# Patient Record
Sex: FEMALE | Race: WHITE | Employment: FULL TIME | URBAN - METROPOLITAN AREA
[De-identification: names, ages, dates, MRNs, and addresses within clinical notes are randomized per-mention and may not be internally consistent; named-entity substitution may affect disease eponyms.]

---

## 2017-01-07 ENCOUNTER — OFFICE VISIT (OUTPATIENT)
Dept: FAMILY MEDICINE CLINIC | Age: 37
End: 2017-01-07

## 2017-01-07 VITALS
DIASTOLIC BLOOD PRESSURE: 71 MMHG | SYSTOLIC BLOOD PRESSURE: 116 MMHG | OXYGEN SATURATION: 99 % | HEART RATE: 70 BPM | WEIGHT: 164.38 LBS | BODY MASS INDEX: 28 KG/M2

## 2017-01-07 DIAGNOSIS — F51.01 PRIMARY INSOMNIA: ICD-10-CM

## 2017-01-07 DIAGNOSIS — F41.1 GENERALIZED ANXIETY DISORDER: ICD-10-CM

## 2017-01-07 PROCEDURE — 99213 OFFICE O/P EST LOW 20 MIN: CPT | Performed by: FAMILY MEDICINE

## 2017-01-07 RX ORDER — FLUOXETINE 20 MG/1
20 TABLET, FILM COATED ORAL DAILY
Qty: 30 TABLET | Refills: 0 | Status: SHIPPED | OUTPATIENT
Start: 2017-01-07 | End: 2017-06-28 | Stop reason: ALTCHOICE

## 2017-01-07 RX ORDER — TRAZODONE HYDROCHLORIDE 100 MG/1
100 TABLET ORAL NIGHTLY
Qty: 90 TABLET | Refills: 3 | Status: SHIPPED | OUTPATIENT
Start: 2017-01-07 | End: 2017-01-07 | Stop reason: SDUPTHER

## 2017-01-07 RX ORDER — TRAZODONE HYDROCHLORIDE 100 MG/1
100 TABLET ORAL NIGHTLY
Qty: 30 TABLET | Refills: 0 | Status: SHIPPED | OUTPATIENT
Start: 2017-01-07 | End: 2020-11-19

## 2017-01-07 RX ORDER — FLUOXETINE HYDROCHLORIDE 40 MG/1
40 CAPSULE ORAL DAILY
Qty: 90 CAPSULE | Refills: 3 | Status: SHIPPED | OUTPATIENT
Start: 2017-01-07 | End: 2017-01-07 | Stop reason: SDUPTHER

## 2017-01-07 RX ORDER — FLUOXETINE HYDROCHLORIDE 40 MG/1
40 CAPSULE ORAL DAILY
Qty: 30 CAPSULE | Refills: 0 | Status: SHIPPED | OUTPATIENT
Start: 2017-01-07 | End: 2017-06-28 | Stop reason: ALTCHOICE

## 2017-01-07 RX ORDER — FLUOXETINE 20 MG/1
20 TABLET, FILM COATED ORAL DAILY
Qty: 90 TABLET | Refills: 3 | Status: SHIPPED | OUTPATIENT
Start: 2017-01-07 | End: 2017-01-07 | Stop reason: SDUPTHER

## 2017-02-21 ENCOUNTER — TELEPHONE (OUTPATIENT)
Dept: FAMILY MEDICINE CLINIC | Age: 37
End: 2017-02-21

## 2017-02-24 RX ORDER — LORAZEPAM 0.5 MG/1
0.5 TABLET ORAL EVERY 8 HOURS PRN
Qty: 30 TABLET | Refills: 0 | Status: SHIPPED | OUTPATIENT
Start: 2017-02-24 | End: 2020-11-19

## 2017-06-28 ENCOUNTER — OFFICE VISIT (OUTPATIENT)
Dept: DERMATOLOGY | Age: 37
End: 2017-06-28

## 2017-06-28 DIAGNOSIS — Z12.83 SCREENING EXAM FOR SKIN CANCER: ICD-10-CM

## 2017-06-28 DIAGNOSIS — D48.5 NEOPLASM OF UNCERTAIN BEHAVIOR OF SKIN: ICD-10-CM

## 2017-06-28 DIAGNOSIS — L57.0 ACTINIC KERATOSIS: ICD-10-CM

## 2017-06-28 DIAGNOSIS — D22.9 MULTIPLE BENIGN NEVI: Primary | ICD-10-CM

## 2017-06-28 PROCEDURE — 17000 DESTRUCT PREMALG LESION: CPT | Performed by: DERMATOLOGY

## 2017-06-28 PROCEDURE — 99213 OFFICE O/P EST LOW 20 MIN: CPT | Performed by: DERMATOLOGY

## 2017-06-28 PROCEDURE — 11100 PR BIOPSY OF SKIN LESION: CPT | Performed by: DERMATOLOGY

## 2017-06-28 RX ORDER — SERTRALINE HYDROCHLORIDE 100 MG/1
100 TABLET, FILM COATED ORAL DAILY
COMMUNITY
End: 2017-10-13 | Stop reason: SDUPTHER

## 2017-07-06 ENCOUNTER — TELEPHONE (OUTPATIENT)
Dept: DERMATOLOGY | Age: 37
End: 2017-07-06

## 2017-07-15 ENCOUNTER — OFFICE VISIT (OUTPATIENT)
Dept: FAMILY MEDICINE CLINIC | Age: 37
End: 2017-07-15

## 2017-07-15 VITALS
DIASTOLIC BLOOD PRESSURE: 74 MMHG | BODY MASS INDEX: 28.42 KG/M2 | HEIGHT: 64 IN | WEIGHT: 166.5 LBS | HEART RATE: 79 BPM | SYSTOLIC BLOOD PRESSURE: 114 MMHG

## 2017-07-15 DIAGNOSIS — F32.1 MODERATE SINGLE CURRENT EPISODE OF MAJOR DEPRESSIVE DISORDER (HCC): ICD-10-CM

## 2017-07-15 DIAGNOSIS — R53.82 CHRONIC FATIGUE: Primary | ICD-10-CM

## 2017-07-15 DIAGNOSIS — K58.1 IRRITABLE BOWEL SYNDROME WITH CONSTIPATION: ICD-10-CM

## 2017-07-15 LAB
A/G RATIO: 1.2 (ref 1.1–2.2)
ALBUMIN SERPL-MCNC: 4.2 G/DL (ref 3.4–5)
ALP BLD-CCNC: 64 U/L (ref 40–129)
ALT SERPL-CCNC: 11 U/L (ref 10–40)
ANION GAP SERPL CALCULATED.3IONS-SCNC: 14 MMOL/L (ref 3–16)
AST SERPL-CCNC: 18 U/L (ref 15–37)
BASOPHILS ABSOLUTE: 0 K/UL (ref 0–0.2)
BASOPHILS RELATIVE PERCENT: 0.4 %
BILIRUB SERPL-MCNC: 0.4 MG/DL (ref 0–1)
BUN BLDV-MCNC: 10 MG/DL (ref 7–20)
CALCIUM SERPL-MCNC: 9.9 MG/DL (ref 8.3–10.6)
CHLORIDE BLD-SCNC: 104 MMOL/L (ref 99–110)
CO2: 23 MMOL/L (ref 21–32)
CORTISOL - AM: 4.1 UG/DL (ref 4.3–22.4)
CREAT SERPL-MCNC: 0.6 MG/DL (ref 0.6–1.1)
EOSINOPHILS ABSOLUTE: 0.1 K/UL (ref 0–0.6)
EOSINOPHILS RELATIVE PERCENT: 1.7 %
GFR AFRICAN AMERICAN: >60
GFR NON-AFRICAN AMERICAN: >60
GLOBULIN: 3.4 G/DL
GLUCOSE BLD-MCNC: 87 MG/DL (ref 70–99)
HCT VFR BLD CALC: 37.4 % (ref 36–48)
HEMOGLOBIN: 12.5 G/DL (ref 12–16)
LYMPHOCYTES ABSOLUTE: 2.2 K/UL (ref 1–5.1)
LYMPHOCYTES RELATIVE PERCENT: 30.9 %
MCH RBC QN AUTO: 30.5 PG (ref 26–34)
MCHC RBC AUTO-ENTMCNC: 33.4 G/DL (ref 31–36)
MCV RBC AUTO: 91.3 FL (ref 80–100)
MONOCYTES ABSOLUTE: 0.5 K/UL (ref 0–1.3)
MONOCYTES RELATIVE PERCENT: 6.9 %
NEUTROPHILS ABSOLUTE: 4.2 K/UL (ref 1.7–7.7)
NEUTROPHILS RELATIVE PERCENT: 60.1 %
PDW BLD-RTO: 13.4 % (ref 12.4–15.4)
PLATELET # BLD: 262 K/UL (ref 135–450)
PMV BLD AUTO: 9.4 FL (ref 5–10.5)
POTASSIUM SERPL-SCNC: 4.8 MMOL/L (ref 3.5–5.1)
RBC # BLD: 4.1 M/UL (ref 4–5.2)
SODIUM BLD-SCNC: 141 MMOL/L (ref 136–145)
TOTAL PROTEIN: 7.6 G/DL (ref 6.4–8.2)
TSH REFLEX: 2.31 UIU/ML (ref 0.27–4.2)
VITAMIN B-12: 332 PG/ML (ref 211–911)
VITAMIN D 25-HYDROXY: 26.4 NG/ML
WBC # BLD: 7 K/UL (ref 4–11)

## 2017-07-15 PROCEDURE — 36415 COLL VENOUS BLD VENIPUNCTURE: CPT | Performed by: FAMILY MEDICINE

## 2017-07-15 PROCEDURE — 99214 OFFICE O/P EST MOD 30 MIN: CPT | Performed by: FAMILY MEDICINE

## 2017-08-08 ENCOUNTER — TELEPHONE (OUTPATIENT)
Dept: FAMILY MEDICINE CLINIC | Age: 37
End: 2017-08-08

## 2017-08-30 ENCOUNTER — NURSE ONLY (OUTPATIENT)
Dept: FAMILY MEDICINE CLINIC | Age: 37
End: 2017-08-30

## 2017-08-30 DIAGNOSIS — R79.89 LOW VITAMIN D LEVEL: ICD-10-CM

## 2017-08-30 DIAGNOSIS — R79.89 LOW SERUM CORTISOL LEVEL: Primary | ICD-10-CM

## 2017-08-30 LAB
CORTISOL - AM: 14.2 UG/DL (ref 4.3–22.4)
VITAMIN D 25-HYDROXY: 36.7 NG/ML

## 2017-08-30 PROCEDURE — 36415 COLL VENOUS BLD VENIPUNCTURE: CPT | Performed by: FAMILY MEDICINE

## 2017-10-13 ENCOUNTER — TELEPHONE (OUTPATIENT)
Dept: FAMILY MEDICINE CLINIC | Age: 37
End: 2017-10-13

## 2017-10-16 RX ORDER — SERTRALINE HYDROCHLORIDE 100 MG/1
150 TABLET, FILM COATED ORAL DAILY
COMMUNITY
Start: 2017-10-16 | End: 2020-11-19 | Stop reason: ALTCHOICE

## 2017-12-14 ENCOUNTER — OFFICE VISIT (OUTPATIENT)
Dept: FAMILY MEDICINE CLINIC | Age: 37
End: 2017-12-14

## 2017-12-14 VITALS
BODY MASS INDEX: 29.44 KG/M2 | SYSTOLIC BLOOD PRESSURE: 116 MMHG | WEIGHT: 171.5 LBS | HEART RATE: 83 BPM | DIASTOLIC BLOOD PRESSURE: 81 MMHG

## 2017-12-14 DIAGNOSIS — J02.9 ACUTE PHARYNGITIS, UNSPECIFIED ETIOLOGY: ICD-10-CM

## 2017-12-14 DIAGNOSIS — H66.004 RECURRENT ACUTE SUPPURATIVE OTITIS MEDIA OF RIGHT EAR WITHOUT SPONTANEOUS RUPTURE OF TYMPANIC MEMBRANE: Primary | ICD-10-CM

## 2017-12-14 PROCEDURE — 99213 OFFICE O/P EST LOW 20 MIN: CPT | Performed by: FAMILY MEDICINE

## 2017-12-14 RX ORDER — AZITHROMYCIN 250 MG/1
TABLET, FILM COATED ORAL
Qty: 6 TABLET | Refills: 0 | Status: SHIPPED | OUTPATIENT
Start: 2017-12-14 | End: 2019-08-28

## 2017-12-14 NOTE — PROGRESS NOTES
Subjective:   She presents for right ear pain sore throat blisters in her throat some congestion but not a lot no coughing or fevers        She is allergic to diphenhydramine; other; and penicillins. Objective:   /81 (Site: Left Arm, Position: Sitting, Cuff Size: Large Adult)   Pulse 83   Wt 171 lb 8 oz (77.8 kg)   BMI 29.44 kg/m²   No results found for this visit on 12/14/17. Exam:  Gen. Appearance: Ill appearing but nontoxic, Atraumatic HEENT:  External ears normal, tympanic membranes effusions noted right TM red and canals clear. Nose congested. Throat red. Neck: no rigidity, no tenderness, supple. Lungs: Clear to auscultation bilaterally. Assessment and Plan:  1. Recurrent acute suppurative otitis media of right ear without spontaneous rupture of tympanic membrane  azithromycin (ZITHROMAX) 250 MG tablet   2. Acute pharyngitis, unspecified etiology       Call or return to office prn if these symptoms worsen or fail to improve as anticipated. Avoid tobacco products exposure. The Healthy Family Handout was given to the patient today.   Miriam Mcarthur M.D.

## 2017-12-14 NOTE — PATIENT INSTRUCTIONS
Please read the healthy family handout that you were given and share it with your family. Please compare this printed medication list with your medications at home to be sure they are the same. If you have any medications that are different please contact us immediately at [phone]. Also review your allergies that we have listed, these may also include medications that you have not been able to tolerate, make sure everything listed is correct. If you have any allergies that are different please contact us immediately at [phone].

## 2018-01-03 DIAGNOSIS — F51.01 PRIMARY INSOMNIA: ICD-10-CM

## 2018-01-06 RX ORDER — TRAZODONE HYDROCHLORIDE 100 MG/1
TABLET ORAL
Qty: 90 TABLET | Refills: 3 | OUTPATIENT
Start: 2018-01-06

## 2018-04-05 ENCOUNTER — HOSPITAL ENCOUNTER (OUTPATIENT)
Dept: OTHER | Age: 38
Discharge: HOME | End: 2018-04-30
Attending: PODIATRIST | Admitting: PODIATRIST

## 2018-04-06 LAB
ALBUMIN SERPL-MCNC: 3.6 G/DL (ref 3.4–5)
ALP BLD-CCNC: 37 U/L (ref 40–129)
ALT SERPL-CCNC: 12 U/L (ref 10–40)
AST SERPL-CCNC: 18 U/L (ref 15–37)
BILIRUB SERPL-MCNC: <0.2 MG/DL (ref 0–1)
BILIRUBIN DIRECT: <0.2 MG/DL (ref 0–0.3)
BILIRUBIN, INDIRECT: ABNORMAL MG/DL (ref 0–1)
TOTAL PROTEIN: 5.9 G/DL (ref 6.4–8.2)

## 2018-05-01 ENCOUNTER — HOSPITAL ENCOUNTER (OUTPATIENT)
Dept: OTHER | Age: 38
Discharge: HOME | End: 2018-05-31
Attending: PODIATRIST | Admitting: PODIATRIST

## 2018-08-21 ENCOUNTER — OFFICE VISIT (OUTPATIENT)
Dept: DERMATOLOGY | Age: 38
End: 2018-08-21

## 2018-08-21 DIAGNOSIS — D22.9 MULTIPLE BENIGN NEVI: Primary | ICD-10-CM

## 2018-08-21 DIAGNOSIS — Z12.83 SCREENING EXAM FOR SKIN CANCER: ICD-10-CM

## 2018-08-21 DIAGNOSIS — L57.0 ACTINIC KERATOSES: ICD-10-CM

## 2018-08-21 PROCEDURE — 99213 OFFICE O/P EST LOW 20 MIN: CPT | Performed by: DERMATOLOGY

## 2018-08-21 PROCEDURE — 17000 DESTRUCT PREMALG LESION: CPT | Performed by: DERMATOLOGY

## 2018-08-21 NOTE — PROGRESS NOTES
[facility]) Dermatology  Jeovanny Beltran, [address]      [facility]  1980    42 y.o. female     Date of Visit: 8/21/2018    Last Visit: 1yr    Chief Complaint: Skin check    History of Present Illness:  1. Here for skin/mole check. No new moles. No moles changing in size, shape, color. No moles associated w/ pain, bleeding, pruritus.   -Has not gone to tanning bed in over 1 year   -Plays softball and likes to spend time in sun. Has started to use SPF 50+ sunscreen regularly     2. History of actinic keratoses s/p cryotherapy. Unsure of new lesions     Review of Systems:  Constitutional: Reports general sense of well-being. Skin: No interval severe sunburns. Allergies: Reviewed and updated. Past Medical History, Surgical History, Medications and Allergies reviewed. Past Medical History:   Diagnosis Date    Allergic rhinitis     Asthma     Congenital anomaly, optic nerve, right (HCC)     Constipation     Family history of colon cancer     Gets colonoscopy    Herpes simplex 2013    type2 on culture done by GYN    Irritable bowel syndrome      Past Surgical History:   Procedure Laterality Date    COLONOSCOPY  2010    ELBOW FRACTURE SURGERY Left 10/28/2016    KNEE SURGERY      lef t    TONSILLECTOMY         Allergies   Allergen Reactions    Diphenhydramine      hyper; Benadryl    Other      Environmental allergies - Right eye twitches, SOB, Nausea    Penicillins      Outpatient Prescriptions Marked as Taking for the 8/21/18 encounter (Office Visit) with Jeovanny Beltran MD   Medication Sig Dispense Refill    Multiple Vitamins-Minerals (MULTIVITAMIN ADULT PO) Take by mouth      Omega-3 Fatty Acids (FISH OIL PO) Take by mouth      traZODone (DESYREL) 100 MG tablet Take 1 tablet by mouth nightly 30 tablet 0    Cetirizine HCl (ZYRTEC ALLERGY) 10 MG CAPS Take  by mouth.  acyclovir (ZOVIRAX) 400 MG tablet Take 400 mg by mouth 2 times daily.       Linaclotide (LINZESS) 290 MCG CAPS Take by mouth. 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 The following were examined and determined to be abnormal: Head/face, Breast/axilla/chest, Abdomen, Back, RUE, LUE, RLE and LLE. -General: Well-appearing, NAD  1. Scattered on the trunk and extremities are multiple well-defined round and oval symmetric smoothly-bordered uniformly brown macules and papules. 2. R nasal bridge 1 - ill-defined irregularly-shaped roughly-scaling thin pink macule(s)/papule(s)     Assessment and Plan     1. Benign acquired melanocytic nevi  -Recommend monthly self skin exams   -Educated regarding the ABCDEs of melanoma detection   -Call for any new/changing moles or concerning lesions  -Reviewed sun protective behavior -- sun avoidance during the peak hours of the day, sun-protective clothing (including hat and sunglasses), sunscreen use (water resistant, broad spectrum, SPF at least 30, need for reapplication every 2 to 3 hours), avoidance of tanning beds   -Return for full skin exam in 1 year (sooner if indicated)     2. Actinic keratosis(es)  -Edu re: relationship with chronic cumulative sun exposure, low premalignant potential.   -1 lesion(s) treated w/ liquid nitrogen x 2 cycles - nose. Edu re: risk of blister formation, discomfort, scar, hypopigmentation. Discussed wound care.

## 2018-12-15 ENCOUNTER — HOSPITAL ENCOUNTER (OUTPATIENT)
Dept: MAMMOGRAPHY | Age: 38
Discharge: HOME | End: 2018-12-15
Payer: COMMERCIAL

## 2018-12-15 DIAGNOSIS — Z12.31 ENCOUNTER FOR SCREENING MAMMOGRAM FOR BREAST CANCER: ICD-10-CM

## 2018-12-15 PROCEDURE — 77063 BREAST TOMOSYNTHESIS BI: CPT

## 2018-12-18 DIAGNOSIS — R92.8 ABNORMAL MAMMOGRAM: Primary | ICD-10-CM

## 2019-01-04 ENCOUNTER — HOSPITAL ENCOUNTER (OUTPATIENT)
Dept: ULTRASOUND IMAGING | Age: 39
Discharge: HOME | End: 2019-01-04
Payer: COMMERCIAL

## 2019-01-04 DIAGNOSIS — R92.8 ABNORMAL MAMMOGRAM: ICD-10-CM

## 2019-01-04 PROCEDURE — 76642 ULTRASOUND BREAST LIMITED: CPT

## 2019-01-08 DIAGNOSIS — R92.8 ABNORMAL MAMMOGRAM: Primary | ICD-10-CM

## 2019-06-24 ENCOUNTER — HOSPITAL ENCOUNTER (OUTPATIENT)
Dept: ULTRASOUND IMAGING | Age: 39
Discharge: HOME | End: 2019-06-24
Payer: COMMERCIAL

## 2019-06-24 DIAGNOSIS — R92.8 ABNORMAL MAMMOGRAM: ICD-10-CM

## 2019-06-24 PROCEDURE — 76642 ULTRASOUND BREAST LIMITED: CPT

## 2019-08-28 ENCOUNTER — OFFICE VISIT (OUTPATIENT)
Dept: ORTHOPEDIC SURGERY | Age: 39
End: 2019-08-28
Payer: COMMERCIAL

## 2019-08-28 VITALS
DIASTOLIC BLOOD PRESSURE: 80 MMHG | WEIGHT: 171.52 LBS | HEIGHT: 64 IN | HEART RATE: 81 BPM | BODY MASS INDEX: 29.28 KG/M2 | SYSTOLIC BLOOD PRESSURE: 118 MMHG

## 2019-08-28 DIAGNOSIS — R52 PAIN: Primary | ICD-10-CM

## 2019-08-28 DIAGNOSIS — S92.301A CLOSED AVULSION FRACTURE OF METATARSAL BONE OF RIGHT FOOT, INITIAL ENCOUNTER: ICD-10-CM

## 2019-08-28 PROCEDURE — L4361 PNEUMA/VAC WALK BOOT PRE OTS: HCPCS | Performed by: PHYSICIAN ASSISTANT

## 2019-08-28 PROCEDURE — 99203 OFFICE O/P NEW LOW 30 MIN: CPT | Performed by: PHYSICIAN ASSISTANT

## 2019-08-29 ENCOUNTER — TELEPHONE (OUTPATIENT)
Dept: ORTHOPEDIC SURGERY | Age: 39
End: 2019-08-29

## 2019-08-29 NOTE — TELEPHONE ENCOUNTER
8/29/19 [facility]    -  NO PRECERT REQUIRED - AUTH IF $1500 AND GREATER - PER SANJANA -  REF #[id number] -  NDS

## 2019-09-11 ENCOUNTER — OFFICE VISIT (OUTPATIENT)
Dept: ORTHOPEDIC SURGERY | Age: 39
End: 2019-09-11
Payer: COMMERCIAL

## 2019-09-11 VITALS
DIASTOLIC BLOOD PRESSURE: 88 MMHG | HEART RATE: 88 BPM | WEIGHT: 171.52 LBS | SYSTOLIC BLOOD PRESSURE: 137 MMHG | HEIGHT: 64 IN | BODY MASS INDEX: 29.28 KG/M2

## 2019-09-11 DIAGNOSIS — S92.301A CLOSED AVULSION FRACTURE OF METATARSAL BONE OF RIGHT FOOT, INITIAL ENCOUNTER: Primary | ICD-10-CM

## 2019-09-11 PROCEDURE — 99203 OFFICE O/P NEW LOW 30 MIN: CPT | Performed by: PODIATRIST

## 2019-09-11 RX ORDER — ETONOGESTREL/ETHINYL ESTRADIOL .12-.015MG
RING, VAGINAL VAGINAL
COMMUNITY
Start: 2019-09-08

## 2019-09-11 NOTE — PROGRESS NOTES
HISTORY OF PRESENT ILLNESS: This is an initial visit for a 38-year-old female with a chief complaint of pain to the side of the right foot. She also complains of right plantar heel pain. Approximately 3 months ago she states that her right foot. She self treated the injury this entire time. Eventually the pain was tolerable so she actually trained and completed a 5K race. There is pain with pressure to the side of the foot and with weightbearing. This is relieved mainly with getting off of the foot. FAMILY HISTORY:  Documented in chart. SOCIAL HISTORY: Documented in chart. REVIEW OF SYSTEMS: The patient denies any fever, chills, or night sweats. The patient also denies developing any type of rash. The patient denies any problems with cardiovascular, pulmonary, gastrointestinal, neurologic, urologic, genitourinary, psychiatric, dermatologic, and HEENT systems. Family History, Social History, and Review of Systems were reviewed from patient history form dated on 9/11/2019 and available in the patient's chart under the MEDIA tab. PHYSICAL EXAM:  There is mild edema to the right foot with no ecchymosis. No open lesions or fracture blisters are noted. The base of the fifth metatarsal is painful to palpation mainly on the dorsal aspect but mildly on the lateral aspect. She has palpable pedal pulses bilateral.  Her sensation is grossly intact bilateral..  The remainder of the exam is unremarkable. X-RAYS:  Three nonweightbearing x-ray views of the right foot were reviewed. These demonstrate fracture of the fifth metatarsal base with bone callus formation present. ASSESSMENT:  Fifth metatarsal fracture, midfoot sprain, right foot    PLAN:  I educated the patient on the pathology and its treatment options. The x-rays were reviewed with the patient. A temporary arch support was applied to her right foot. She will combine this with a high tide walker she has been using.     I

## 2019-10-02 ENCOUNTER — OFFICE VISIT (OUTPATIENT)
Dept: ORTHOPEDIC SURGERY | Age: 39
End: 2019-10-02
Payer: COMMERCIAL

## 2019-10-02 VITALS
HEART RATE: 85 BPM | HEIGHT: 64 IN | BODY MASS INDEX: 29.28 KG/M2 | DIASTOLIC BLOOD PRESSURE: 70 MMHG | WEIGHT: 171.52 LBS | SYSTOLIC BLOOD PRESSURE: 118 MMHG

## 2019-10-02 DIAGNOSIS — S93.621A TARSOMETATARSAL (JOINT) (LIGAMENT) SPRAIN, RIGHT, INITIAL ENCOUNTER: Primary | ICD-10-CM

## 2019-10-02 DIAGNOSIS — M79.671 FOOT PAIN, RIGHT: ICD-10-CM

## 2019-10-02 DIAGNOSIS — S93.401A MILD ANKLE SPRAIN, RIGHT, INITIAL ENCOUNTER: ICD-10-CM

## 2019-10-02 DIAGNOSIS — S92.301A CLOSED AVULSION FRACTURE OF METATARSAL BONE OF RIGHT FOOT, INITIAL ENCOUNTER: ICD-10-CM

## 2019-10-02 PROCEDURE — 99213 OFFICE O/P EST LOW 20 MIN: CPT | Performed by: PODIATRIST

## 2019-10-07 ENCOUNTER — HOSPITAL ENCOUNTER (OUTPATIENT)
Dept: PHYSICAL THERAPY | Age: 39
Setting detail: THERAPIES SERIES
Discharge: HOME | End: 2019-10-07
Payer: COMMERCIAL

## 2019-10-07 PROCEDURE — 97161 PT EVAL LOW COMPLEX 20 MIN: CPT

## 2019-10-07 PROCEDURE — 97110 THERAPEUTIC EXERCISES: CPT

## 2019-10-07 PROCEDURE — 97112 NEUROMUSCULAR REEDUCATION: CPT

## 2019-10-14 ENCOUNTER — HOSPITAL ENCOUNTER (OUTPATIENT)
Dept: PHYSICAL THERAPY | Age: 39
Setting detail: THERAPIES SERIES
Discharge: HOME | End: 2019-10-14
Payer: COMMERCIAL

## 2019-10-14 PROCEDURE — 97140 MANUAL THERAPY 1/> REGIONS: CPT

## 2019-10-14 PROCEDURE — 97110 THERAPEUTIC EXERCISES: CPT

## 2019-10-14 PROCEDURE — 97112 NEUROMUSCULAR REEDUCATION: CPT

## 2019-10-21 ENCOUNTER — HOSPITAL ENCOUNTER (OUTPATIENT)
Dept: PHYSICAL THERAPY | Age: 39
Setting detail: THERAPIES SERIES
Discharge: HOME | End: 2019-10-21
Payer: COMMERCIAL

## 2019-10-21 PROCEDURE — 97112 NEUROMUSCULAR REEDUCATION: CPT

## 2019-10-21 PROCEDURE — 97110 THERAPEUTIC EXERCISES: CPT

## 2019-10-22 ENCOUNTER — OFFICE VISIT (OUTPATIENT)
Dept: ORTHOPEDIC SURGERY | Age: 39
End: 2019-10-22
Payer: COMMERCIAL

## 2019-10-22 VITALS
DIASTOLIC BLOOD PRESSURE: 88 MMHG | SYSTOLIC BLOOD PRESSURE: 129 MMHG | HEIGHT: 64 IN | HEART RATE: 70 BPM | WEIGHT: 171.52 LBS | BODY MASS INDEX: 29.28 KG/M2

## 2019-10-22 DIAGNOSIS — S92.301A CLOSED AVULSION FRACTURE OF METATARSAL BONE OF RIGHT FOOT, INITIAL ENCOUNTER: ICD-10-CM

## 2019-10-22 DIAGNOSIS — M79.671 FOOT PAIN, RIGHT: Primary | ICD-10-CM

## 2019-10-22 DIAGNOSIS — S93.401A MILD ANKLE SPRAIN, RIGHT, INITIAL ENCOUNTER: ICD-10-CM

## 2019-10-22 DIAGNOSIS — S93.621A TARSOMETATARSAL (JOINT) (LIGAMENT) SPRAIN, RIGHT, INITIAL ENCOUNTER: ICD-10-CM

## 2019-10-22 PROCEDURE — L3040 FT ARCH SUPRT PREMOLD LONGIT: HCPCS | Performed by: PODIATRIST

## 2019-10-22 PROCEDURE — 99213 OFFICE O/P EST LOW 20 MIN: CPT | Performed by: PODIATRIST

## 2019-10-28 ENCOUNTER — APPOINTMENT (OUTPATIENT)
Dept: PHYSICAL THERAPY | Age: 39
End: 2019-10-28
Payer: COMMERCIAL

## 2020-07-06 NOTE — PROGRESS NOTES
[facility]) Dermatology  Timo Massey MD  [phone]      Kaycee Filter  1980    37 y.o. female     Date of Visit: 7/7/2020    Last Visit: 1.5yr    Chief Complaint: Skin check    History of Present Illness:  1. Here for skin/mole check. No new moles. No moles changing in size, shape, color. No moles associated w/ pain, bleeding, pruritus.   -Prior tanning bed use   -Plays softball and likes to spend time in sun. Uses SPF 50+ sunscreen regularly     2. History of actinic keratoses s/p cryotherapy. Unsure of new lesions     3. Concerned about a new asymptomatic lesion under R breast     4. Concerned about a new raised lesion on R side of chest     Review of Systems:  Constitutional: Reports general sense of well-being. Skin: No interval severe sunburns. Allergies: Reviewed and updated. Past Medical History, Surgical History, Medications and Allergies reviewed. Past Medical History:   Diagnosis Date    Allergic rhinitis     Asthma     Congenital anomaly, optic nerve, right (HCC)     Constipation     Family history of colon cancer     Gets colonoscopy    Herpes simplex 2013    type2 on culture done by GYN    Irritable bowel syndrome      Past Surgical History:   Procedure Laterality Date    COLONOSCOPY  2010    ELBOW FRACTURE SURGERY Left 10/28/2016    KNEE SURGERY      lef t    TONSILLECTOMY         Allergies   Allergen Reactions    Diphenhydramine      hyper;  Benadryl    Other      Environmental allergies - Right eye twitches, SOB, Nausea    Penicillins      Outpatient Medications Marked as Taking for the 7/7/20 encounter (Office Visit) with Timo Massey MD   Medication Sig Dispense Refill    hydrOXYzine (VISTARIL) 50 MG capsule 50 mg daily       sodium chloride nebulizer 0.9 % NEBU 30 mL with albuterol (5 MG/ML) 0.5% NEBU Inhale into the lungs as needed      NUVARING 0.12-0.015 MG/24HR vaginal ring       Multiple Vitamins-Minerals (MULTIVITAMIN ADULT PO) Take by mouth      Omega-3 Fatty Acids (FISH OIL PO) Take by mouth      sertraline (ZOLOFT) 100 MG tablet Take 1.5 tablets by mouth daily      LORazepam (ATIVAN) 0.5 MG tablet Take 1 tablet by mouth every 8 hours as needed for Anxiety 30 tablet 0    traZODone (DESYREL) 100 MG tablet Take 1 tablet by mouth nightly 30 tablet 0    Cetirizine HCl (ZYRTEC ALLERGY) 10 MG CAPS Take  by mouth.  acyclovir (ZOVIRAX) 400 MG tablet Take 400 mg by mouth 2 times daily.  Linaclotide (LINZESS) 290 MCG CAPS Take  by mouth. 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 The following were examined and determined to be abnormal: Head/face, Breast/axilla/chest, Abdomen, Back, RUE, LUE, RLE and LLE. -General: Well-appearing, NAD  1. Scattered on the trunk and extremities are multiple well-defined round and oval symmetric smoothly-bordered uniformly brown macules and papules. 2. R upper cheek 1, R nasal bridge 1 - ill-defined irregularly-shaped roughly-scaling thin pink macule(s)/papule(s)   3. Inferior aspect of R breast - well-defined \"stuck-on\" verrucous tan-brown papule(s)   4. R lateral chest - pedunculated skin-colored soft papule     Assessment and Plan     1. Benign acquired melanocytic nevi  -Recommend monthly self skin exams   -Educated regarding the ABCDEs of melanoma detection   -Call for any new/changing moles or concerning lesions  -Reviewed sun protective behavior -- sun avoidance during the peak hours of the day, sun-protective clothing (including hat and sunglasses), sunscreen use (water resistant, broad spectrum, SPF at least 30, need for reapplication every 2 to 3 hours), avoidance of tanning beds   -Return for full skin exam in 1 year (sooner if indicated)     2.  Actinic keratosis(es)  -Edu re: relationship with chronic cumulative sun exposure, low premalignant potential.   -2 lesion(s) treated w/ liquid nitrogen x 2 cycles - R upper cheek 1, R nasal bridge 1. Edu re: risk of blister formation, discomfort, scar, hypopigmentation. Discussed wound care. 3. Seborrheic keratosis(es)  -Reassurance re: benignity  -No treatment performed.       4. Skin tag  -Reassurance re: benignity

## 2020-07-07 ENCOUNTER — OFFICE VISIT (OUTPATIENT)
Dept: DERMATOLOGY | Age: 40
End: 2020-07-07
Payer: COMMERCIAL

## 2020-07-07 VITALS — TEMPERATURE: 97.5 F

## 2020-07-07 PROCEDURE — 17003 DESTRUCT PREMALG LES 2-14: CPT | Performed by: DERMATOLOGY

## 2020-07-07 PROCEDURE — 17000 DESTRUCT PREMALG LESION: CPT | Performed by: DERMATOLOGY

## 2020-07-07 PROCEDURE — 99213 OFFICE O/P EST LOW 20 MIN: CPT | Performed by: DERMATOLOGY

## 2020-07-07 RX ORDER — HYDROXYZINE PAMOATE 50 MG/1
50 CAPSULE ORAL DAILY
COMMUNITY
Start: 2020-06-29

## 2020-09-01 ENCOUNTER — HOSPITAL ENCOUNTER (OUTPATIENT)
Dept: MAMMOGRAPHY | Age: 40
Discharge: HOME | End: 2020-09-01
Payer: COMMERCIAL

## 2020-09-01 PROCEDURE — 77063 BREAST TOMOSYNTHESIS BI: CPT

## 2020-09-10 ENCOUNTER — HOSPITAL ENCOUNTER (OUTPATIENT)
Dept: ULTRASOUND IMAGING | Age: 40
Discharge: HOME | End: 2020-09-10
Payer: COMMERCIAL

## 2020-09-10 ENCOUNTER — HOSPITAL ENCOUNTER (OUTPATIENT)
Dept: MAMMOGRAPHY | Age: 40
Discharge: HOME | End: 2020-09-10
Payer: COMMERCIAL

## 2020-09-10 PROCEDURE — G0279 TOMOSYNTHESIS, MAMMO: HCPCS

## 2020-09-24 LAB
ALBUMIN SERPL-MCNC: 3.9 G/DL
ALP BLD-CCNC: 63 U/L
ALT SERPL-CCNC: 30 U/L
ANION GAP SERPL CALCULATED.3IONS-SCNC: 1.4 MMOL/L
AST SERPL-CCNC: 40 U/L
BASOPHILS ABSOLUTE: NORMAL
BASOPHILS RELATIVE PERCENT: NORMAL
BILIRUB SERPL-MCNC: 0.3 MG/DL (ref 0.1–1.4)
BUN BLDV-MCNC: 0.3 MG/DL
CALCIUM SERPL-MCNC: 9.1 MG/DL
CHLORIDE BLD-SCNC: 107 MMOL/L
CHOLESTEROL, TOTAL: 204 MG/DL
CHOLESTEROL/HDL RATIO: NORMAL
CO2: 19 MMOL/L
CREAT SERPL-MCNC: 0.72 MG/DL
EOSINOPHILS ABSOLUTE: NORMAL
EOSINOPHILS RELATIVE PERCENT: NORMAL
GFR CALCULATED: 104
GLUCOSE BLD-MCNC: 102 MG/DL
HCT VFR BLD CALC: 38.9 % (ref 36–46)
HDLC SERPL-MCNC: 63 MG/DL (ref 35–70)
HEMOGLOBIN: 13.1 G/DL (ref 12–16)
LDL CHOLESTEROL CALCULATED: 118 MG/DL (ref 0–160)
LYMPHOCYTES ABSOLUTE: NORMAL
LYMPHOCYTES RELATIVE PERCENT: NORMAL
MCH RBC QN AUTO: 30.7 PG
MCHC RBC AUTO-ENTMCNC: 33.7 G/DL
MCV RBC AUTO: 91 FL
MONOCYTES ABSOLUTE: NORMAL
MONOCYTES RELATIVE PERCENT: NORMAL
NEUTROPHILS ABSOLUTE: NORMAL
NEUTROPHILS RELATIVE PERCENT: NORMAL
NONHDLC SERPL-MCNC: NORMAL MG/DL
PDW BLD-RTO: 12.2 %
PLATELET # BLD: 257 K/ΜL
PMV BLD AUTO: NORMAL FL
POTASSIUM SERPL-SCNC: 4.3 MMOL/L
RBC # BLD: 12.2 10^6/ΜL
SODIUM BLD-SCNC: 139 MMOL/L
TOTAL PROTEIN: 6.6
TRIGL SERPL-MCNC: 128 MG/DL
TSH SERPL DL<=0.05 MIU/L-ACNC: 1.5 UIU/ML
VITAMIN B-12: 388
VITAMIN D 25-HYDROXY: 35
VITAMIN D2, 25 HYDROXY: NORMAL
VITAMIN D3,25 HYDROXY: NORMAL
VLDLC SERPL CALC-MCNC: 23 MG/DL
WBC # BLD: 5.9 10^3/ML

## 2020-11-19 ENCOUNTER — VIRTUAL VISIT (OUTPATIENT)
Dept: FAMILY MEDICINE CLINIC | Age: 40
End: 2020-11-19
Payer: COMMERCIAL

## 2020-11-19 ENCOUNTER — TELEPHONE (OUTPATIENT)
Dept: FAMILY MEDICINE CLINIC | Age: 40
End: 2020-11-19

## 2020-11-19 PROCEDURE — 99203 OFFICE O/P NEW LOW 30 MIN: CPT | Performed by: NURSE PRACTITIONER

## 2020-11-19 RX ORDER — ALBUTEROL SULFATE 90 UG/1
2 AEROSOL, METERED RESPIRATORY (INHALATION) EVERY 6 HOURS PRN
COMMUNITY
Start: 2020-11-19

## 2020-11-19 ASSESSMENT — ENCOUNTER SYMPTOMS
ALLERGIC/IMMUNOLOGIC NEGATIVE: 1
ABDOMINAL PAIN: 0
GASTROINTESTINAL NEGATIVE: 1
NAUSEA: 0
ANAL BLEEDING: 0
EYES NEGATIVE: 1
SHORTNESS OF BREATH: 1
BLOOD IN STOOL: 0

## 2020-11-19 ASSESSMENT — PATIENT HEALTH QUESTIONNAIRE - PHQ9
SUM OF ALL RESPONSES TO PHQ QUESTIONS 1-9: 0
2. FEELING DOWN, DEPRESSED OR HOPELESS: 0
SUM OF ALL RESPONSES TO PHQ QUESTIONS 1-9: 0
1. LITTLE INTEREST OR PLEASURE IN DOING THINGS: 0
SUM OF ALL RESPONSES TO PHQ QUESTIONS 1-9: 0
SUM OF ALL RESPONSES TO PHQ9 QUESTIONS 1 & 2: 0

## 2020-11-19 NOTE — TELEPHONE ENCOUNTER
Please call patient and inform her that I found her HSV (herpes) typing in her old records.   On 4/16/2013 it was confirmed by lab she has HSV-2/ type 2 genital herpes  She can continue using preventative acyclovir daily or may change to episodic/prn therapy for outbreaks only if she desires

## 2020-11-19 NOTE — PROGRESS NOTES
Galilea Madden is a 41 y.o. female evaluated via telephone on 11/19/2020.       Consent:  She and/or health care decision maker is aware that that she may receive a bill for this telephone service, depending on her insurance coverage, and has provided verbal consent to proceed: Yes      Gerri Sidhu

## 2021-03-31 ENCOUNTER — TELEPHONE (OUTPATIENT)
Dept: FAMILY MEDICINE CLINIC | Age: 41
End: 2021-03-31

## 2021-03-31 NOTE — TELEPHONE ENCOUNTER
----- Message from [facility] sent at 3/31/2021  9:14 AM EDT -----  Subject: Message to Provider    QUESTIONS  Information for Provider? Patient wants to know if the office received her   records from [address] She also would like to know if she can get her   bloodwork done. Doctor Mele Ruiz wanted to look at her records first. Call   patient back. ---------------------------------------------------------------------------  --------------  Antonia LEAVITT  What is the best way for the office to contact you? OK to leave message on   voicemail  Preferred Call Back Phone Number? [phone]  ---------------------------------------------------------------------------  --------------  SCRIPT ANSWERS  Relationship to Patient?  Self
Pt informed that we have not received any records
We have not received any records.   Patient most likely needs to come in to sign a records release before they will send them (no records release has been scanned into media)
[phone]

## 2021-04-29 ENCOUNTER — TELEPHONE (OUTPATIENT)
Dept: FAMILY MEDICINE CLINIC | Age: 41
End: 2021-04-29

## 2021-04-29 NOTE — TELEPHONE ENCOUNTER
Pt was wanting to see if she could get some orders for blood work since we got her records from [employer].

## 2021-05-05 ENCOUNTER — OFFICE VISIT (OUTPATIENT)
Dept: FAMILY MEDICINE CLINIC | Age: 41
End: 2021-05-05
Payer: COMMERCIAL

## 2021-05-05 VITALS
WEIGHT: 209 LBS | DIASTOLIC BLOOD PRESSURE: 70 MMHG | HEART RATE: 90 BPM | SYSTOLIC BLOOD PRESSURE: 112 MMHG | HEIGHT: 64 IN | BODY MASS INDEX: 35.68 KG/M2 | OXYGEN SATURATION: 97 %

## 2021-05-05 DIAGNOSIS — E55.9 VITAMIN D DEFICIENCY: ICD-10-CM

## 2021-05-05 DIAGNOSIS — Z11.4 SCREENING FOR HIV WITHOUT PRESENCE OF RISK FACTORS: ICD-10-CM

## 2021-05-05 DIAGNOSIS — R06.83 SNORING: ICD-10-CM

## 2021-05-05 DIAGNOSIS — R53.83 FATIGUE, UNSPECIFIED TYPE: ICD-10-CM

## 2021-05-05 DIAGNOSIS — R40.0 DAYTIME SOMNOLENCE: ICD-10-CM

## 2021-05-05 DIAGNOSIS — R63.5 ABNORMAL WEIGHT GAIN: ICD-10-CM

## 2021-05-05 DIAGNOSIS — Z72.89 OTHER PROBLEMS RELATED TO LIFESTYLE: ICD-10-CM

## 2021-05-05 DIAGNOSIS — E66.9 CLASS 2 OBESITY WITHOUT SERIOUS COMORBIDITY WITH BODY MASS INDEX (BMI) OF 35.0 TO 35.9 IN ADULT, UNSPECIFIED OBESITY TYPE: Primary | ICD-10-CM

## 2021-05-05 DIAGNOSIS — Z13.1 ENCOUNTER FOR SCREENING FOR DIABETES MELLITUS: ICD-10-CM

## 2021-05-05 PROCEDURE — 99214 OFFICE O/P EST MOD 30 MIN: CPT | Performed by: NURSE PRACTITIONER

## 2021-05-06 ENCOUNTER — VIRTUAL VISIT (OUTPATIENT)
Dept: PULMONOLOGY | Age: 41
End: 2021-05-06
Payer: COMMERCIAL

## 2021-05-06 ENCOUNTER — TELEPHONE (OUTPATIENT)
Dept: PULMONOLOGY | Age: 41
End: 2021-05-06

## 2021-05-06 DIAGNOSIS — G47.10 HYPERSOMNIA: ICD-10-CM

## 2021-05-06 DIAGNOSIS — F45.8 BRUXISM: ICD-10-CM

## 2021-05-06 DIAGNOSIS — R06.83 SNORING: Primary | ICD-10-CM

## 2021-05-06 DIAGNOSIS — R53.83 FATIGUE, UNSPECIFIED TYPE: ICD-10-CM

## 2021-05-06 LAB
ESTIMATED AVERAGE GLUCOSE: 102.5 MG/DL
HBA1C MFR BLD: 5.2 %
HEPATITIS C ANTIBODY INTERPRETATION: NORMAL
HIV AG/AB: NORMAL
HIV ANTIGEN: NORMAL
HIV-1 ANTIBODY: NORMAL
HIV-2 AB: NORMAL
T3 TOTAL: 1.84 NG/ML (ref 0.8–2)
T4 FREE: 1.1 NG/DL (ref 0.9–1.8)
THYROID PEROXIDASE (TPO) ABS: 11 IU/ML
TSH SERPL DL<=0.05 MIU/L-ACNC: 1.54 UIU/ML (ref 0.27–4.2)
VITAMIN D 25-HYDROXY: 34 NG/ML

## 2021-05-06 PROCEDURE — 99204 OFFICE O/P NEW MOD 45 MIN: CPT | Performed by: INTERNAL MEDICINE

## 2021-05-06 ASSESSMENT — SLEEP AND FATIGUE QUESTIONNAIRES
HOW LIKELY ARE YOU TO NOD OFF OR FALL ASLEEP WHILE WATCHING TV: 3
ESS TOTAL SCORE: 11
HOW LIKELY ARE YOU TO NOD OFF OR FALL ASLEEP WHILE LYING DOWN TO REST IN THE AFTERNOON WHEN CIRCUMSTANCES PERMIT: 3
HOW LIKELY ARE YOU TO NOD OFF OR FALL ASLEEP IN A CAR, WHILE STOPPED FOR A FEW MINUTES IN TRAFFIC: 0
HOW LIKELY ARE YOU TO NOD OFF OR FALL ASLEEP WHILE SITTING AND READING: 1

## 2021-05-06 NOTE — TELEPHONE ENCOUNTER
.Within this Telehealth Consent, the terms you and yours refer to the person using the Telehealth Service (Service), or in the case of a use of the Service by or on behalf of a minor, you and yours refer to and include (i) the parent or legal guardian who provides consent to the use of the Service by such minor or uses the Service on behalf of such minor, and (ii) the minor for whom consent is being provided or on whose behalf the Service is being utilized. When using Service, you will be consulting with your health care providers via the use of Telehealth.   Telehealth involves the delivery of healthcare services using electronic communications, information technology or other means between a healthcare provider and a patient who are not in the same physical location. Telehealth may be used for diagnosis, treatment, follow-up and/or patient education, and may include, but is not limited to, one or more of the following:    Electronic transmission of medical records, photo images, personal health information or other data between a patient and a healthcare provider    Interactions between a patient and healthcare provider via audio, video and/or data communications    Use of output data from medical devices, sound and video files    Anticipated Benefits   The use of Telehealth by your Provider(s) through the Service may have the following possible benefits:    Making it easier and more efficient for you to access medical care and treatment for the conditions treated by such Provider(s) utilizing the Service    Allowing you to obtain medical care and treatment by Provider(s) at times that are convenient for you    Enabling you to interact with Provider(s) without the necessity of an in-office appointment     Possible Risks   While the use of Telehealth can provide potential benefits for you, there are also potential risks associated with the use of Telehealth.  These risks include, but may not be the provision of medical care and treatment via Telehealth and the Service and you may not be able to receive diagnosis and/or treatment through the Service for every condition for which you seek diagnosis and/or treatment. 11. There are potential risks to the use of Telehealth, including but not limited to the risks described in this Telehealth Consent. 12. Your Provider(s) have discussed the use of Telehealth and the Service with you, including the benefits and risks of such and you have provided oral consent to your Provider(s) for the use of Telehealth and the Service. 15. You understand that it is your duty to provide your Provider(s) truthful, accurate and complete information, including all relevant information regarding care that you may have received or may be receiving from other healthcare providers outside of the Service. 14. You understand that each of your Provider(s) may determine in his or sole discretion that your condition is not suitable for diagnosis and/or treatment using the Service, and that you may need to seek medical care and treatment a specialist or other healthcare provider, outside of the Service. 15. You understand that you are fully responsible for payment for all services provided by Provider(s) or through use of the Service and that you may not be able to use third-party insurance. 16. You represent that (a) you have read this Telehealth Consent carefully, (b) you understand the risks and benefits of the Service and the use of Telehealth in the medical care and treatment provided to you by Provider(s) using the Service, and (c) you have the legal capacity and authority to provide this consent for yourself and/or the minor for which you are consenting under applicable federal and state laws, including laws relating to the age of [de-identified] and/or parental/guardian consent.    17. You give your informed consent to the use of Telehealth by Provider(s) using the Service under the terms described in the Terms of Service and this Telehealth Consent. The patient was read the following statement and has consented to the visit as of 5/6/21. The patient has been scheduled for their first telehealth visit on 5/6/21 with Dr. Oscar Renee.

## 2021-05-06 NOTE — PROGRESS NOTES
[facility]                                                          TELEHEALTH EVALUATION: Service performed was Audio/Visual (During MBXKA-59 public health emergency) and not a face-to-face visit           CHIEF COMPLAINT: Snoring     Consulting provider: Jazmin Kendrick, ALLAN - CNP    HPI:   Snoring at night for the past all her life. The severity of snoring is moderate to severe. Worse in supine position and better on the side. Wakes self snoring. Not sure abut observed sleep apnea. No dry mouth upon awakening. Patient is complaining of daytime sleepiness, fatigue and tiredness during the day. Bedtime 9:30 pm and rise time is 6 am. Sleep onset few seconds. 1 nocturia. Wakes up 3 times at night. It takes him 10 minutes to fall back a sleep. 2 naps/weekedn for 1-2 hrs. Sometimes headache in am. + Bruxism. No car wrecks or near wrecks because of the sleepiness. No nodding off while driving. Old records were reviewed and summarized by me. Past Medical History:   Diagnosis Date    Allergic rhinitis     Anxiety     Asthma     Congenital anomaly, optic nerve, right ([address]     Constipation     Depression     Family history of colon cancer     Gets colonoscopy    Family history of ovarian cancer     Family history of thyroid cancer     Herpes simplex 2013    type2 on culture done by GYN    Hyperlipidemia     Irritable bowel syndrome        Past Surgical History:        Procedure Laterality Date    COLONOSCOPY  2010    ELBOW FRACTURE SURGERY Left 10/28/2016    INCONTINENCE SURGERY  2019    KNEE SURGERY      lef t    TONSILLECTOMY         Allergies:  is allergic to diphenhydramine; other; and penicillins. Social History:    TOBACCO:   reports that she quit smoking about 11 years ago. Her smoking use included cigarettes. She has a 6.00 pack-year smoking history.  She has never used smokeless tobacco.  ETOH:   reports current alcohol use. Family History:       Problem Relation Age of Onset    Cancer Paternal Grandfather         multiple NMSC    Cancer Mother         thyroid, colon,ovarian,    Ulcerative Colitis Mother     Cancer Father         skin       Current Medications:    Current Outpatient Medications:     albuterol sulfate HFA (PROAIR HFA) 108 (90 Base) MCG/ACT inhaler, Inhale 2 puffs into the lungs every 6 hours as needed for Wheezing or Shortness of Breath, Disp: , Rfl:     hydrOXYzine (VISTARIL) 50 MG capsule, 50 mg daily , Disp: , Rfl:     NUVARING 0.12-0.015 MG/24HR vaginal ring, , Disp: , Rfl:     Multiple Vitamins-Minerals (MULTIVITAMIN ADULT PO), Take by mouth, Disp: , Rfl:     Cetirizine HCl (ZYRTEC ALLERGY) 10 MG CAPS, Take  by mouth., Disp: , Rfl:     acyclovir (ZOVIRAX) 400 MG tablet, Take 400 mg by mouth 2 times daily. , Disp: , Rfl:     Linaclotide (LINZESS) 290 MCG CAPS, Take  by mouth., Disp: , Rfl:       REVIEW OF SYSTEMS:  Constitutional: Negative for fever  HENT: Negative for sore throat  Eyes: Negative for redness   Respiratory: Negative for dyspnea, cough  Cardiovascular: Negative for chest pain  Gastrointestinal: Negative for vomiting, diarrhea   Genitourinary: Negative for hematuria   Musculoskeletal: Negative for arthralgias   Skin: Negative for rash  Neurological: Negative for syncope  Hematological: Negative for adenopathy  Psychiatric/Behavorial: Negative for anxiety      Objective:   PHYSICAL EXAM:    Last menstrual period 04/21/2021.' on RA  O2 Sat:  Mallampati class IV. Temperature:  Constitutional:  No acute distress. Appears well developed and nourished. Eyes: No sclera icterus. EOM intact. No visible discharge. HENT: Head is normocephalic and atraumatic. Mucus membranes are moist and the tongue appears normal. Normal appearing nose. External Ears normal.   Neck: No visualized mass. [place] is midline   Resp: No accessory muscle use.  Respiratory effort normal. No visualized signs of difficulty breathing or respiratory distress. Cardiovascular: No LE edema. Musculoskeletal: Normal gait with no signs of ataxia. Normal range of motion of the neck. Skin: No significant exanthematous lesions or discoloration noted on facial skin    Neuro: Awake. Alert. Able to follow commands. No facial asymmetry. No gaze palsy. Psych: No agitation. Normal affect. No hallucinations. Oriented to person/time/place. No anxiety. Normal judgement and insight. DATA reviewed by me:   TSH 1.54     Assessment:       · Snoring  · Hypersomnia and Fatigue   · Bruxism  · Mild intermittent asthma on albuterol as needed      Plan:        HST evaluate for sleep related breathing disorder.  Treatment options were discussed with patient if HST reveals SHANICE, including CPAP therapy, oral appliances, upper airway surgery and hypoglossal nerve stimulation.  APAP min pressure of 8 and max pressure of 16 cmH2O if positive HST    Discussed with patient the pathophysiology of apnea.  Sleep hygiene   Avoid sedatives, alcohol and caffeinated drinks at bed time.  No driving motorized vehicles or operating heavy machinery while fatigue, drowsy or sleepy.  Weight loss is also recommended as a long-term intervention.  Continue with mouthguard.  Complications of SHANICE if not treated were discussed with patient patient to include systemic hypertension, pulmonary hypertension, cardiovascular morbidities, car accidents and all cause mortality. Mariella Dickerson is a 41 y.o. female being evaluated by a Virtual Visit (video visit) encounter to address concerns as mentioned above. A caregiver was present when appropriate. Due to this being a TeleHealth encounter (During [place] [address] public health emergency), evaluation of the following organ systems was limited: Vitals/Constitutional/EENT/Resp/CV/GI//MS/Neuro/Skin/Heme-Lymph-Imm.   Pursuant to the emergency declaration under the [facility], [address] waiver authority and the [employer] and [employer] Act, this Virtual Visit was conducted with patient's (and/or legal guardian's) consent, to reduce the patient's risk of exposure to COVID-19 and provide necessary medical care. The patient (and/or legal guardian) has also been advised to contact this office for worsening conditions or problems, and seek emergency medical treatment and/or call 911 if deemed necessary. Services were provided through a video synchronous discussion virtually to substitute for in-person clinic visit. Patient was located in her home, provider was located in his office. --Ger Dunaway MD on 5/6/2021 at 11:51 AM    An electronic signature was used to authenticate this note.

## 2021-05-11 DIAGNOSIS — E55.9 VITAMIN D DEFICIENCY: Primary | ICD-10-CM

## 2021-05-20 PROBLEM — E55.9 VITAMIN D DEFICIENCY: Status: ACTIVE | Noted: 2021-05-20

## 2021-05-20 PROBLEM — R53.83 FATIGUE: Status: ACTIVE | Noted: 2021-05-20

## 2021-05-20 PROBLEM — E66.812 CLASS 2 OBESITY WITHOUT SERIOUS COMORBIDITY WITH BODY MASS INDEX (BMI) OF 35.0 TO 35.9 IN ADULT: Status: ACTIVE | Noted: 2021-05-20

## 2021-05-20 PROBLEM — R63.5 ABNORMAL WEIGHT GAIN: Status: ACTIVE | Noted: 2021-05-20

## 2021-05-20 PROBLEM — R06.83 SNORING: Status: ACTIVE | Noted: 2021-05-20

## 2021-05-20 PROBLEM — E66.9 CLASS 2 OBESITY WITHOUT SERIOUS COMORBIDITY WITH BODY MASS INDEX (BMI) OF 35.0 TO 35.9 IN ADULT: Status: ACTIVE | Noted: 2021-05-20

## 2021-05-20 PROBLEM — R40.0 DAYTIME SOMNOLENCE: Status: ACTIVE | Noted: 2021-05-20

## 2021-05-20 ASSESSMENT — ENCOUNTER SYMPTOMS
EYES NEGATIVE: 1
SHORTNESS OF BREATH: 0
NAUSEA: 0
ABDOMINAL PAIN: 0
BLOOD IN STOOL: 0
ANAL BLEEDING: 0
GASTROINTESTINAL NEGATIVE: 1
ALLERGIC/IMMUNOLOGIC NEGATIVE: 1
RESPIRATORY NEGATIVE: 1

## 2021-05-27 ENCOUNTER — HOSPITAL ENCOUNTER (OUTPATIENT)
Dept: SLEEP CENTER | Age: 41
Discharge: HOME | End: 2021-05-29
Payer: COMMERCIAL

## 2021-05-27 DIAGNOSIS — G47.10 HYPERSOMNIA: ICD-10-CM

## 2021-05-27 DIAGNOSIS — R53.83 FATIGUE, UNSPECIFIED TYPE: ICD-10-CM

## 2021-05-27 DIAGNOSIS — R06.83 SNORING: ICD-10-CM

## 2021-05-27 PROCEDURE — 95806 SLEEP STUDY UNATT&RESP EFFT: CPT | Performed by: INTERNAL MEDICINE

## 2021-05-27 PROCEDURE — 95806 SLEEP STUDY UNATT&RESP EFFT: CPT

## 2021-06-01 DIAGNOSIS — G47.33 OSA (OBSTRUCTIVE SLEEP APNEA): Primary | ICD-10-CM

## 2021-06-17 ENCOUNTER — TELEPHONE (OUTPATIENT)
Dept: FAMILY MEDICINE CLINIC | Age: 41
End: 2021-06-17

## 2021-06-17 DIAGNOSIS — N95.1 MENOPAUSAL SYMPTOMS: Primary | ICD-10-CM

## 2021-06-17 NOTE — TELEPHONE ENCOUNTER
Ok to order [facility] and   I also need to know the 1st day of her last menstrual cycle

## 2021-06-21 ENCOUNTER — NURSE ONLY (OUTPATIENT)
Dept: FAMILY MEDICINE CLINIC | Age: 41
End: 2021-06-21
Payer: COMMERCIAL

## 2021-06-21 DIAGNOSIS — N95.1 MENOPAUSAL SYMPTOMS: ICD-10-CM

## 2021-06-21 LAB
FOLLICLE STIMULATING HORMONE: 15.6 MIU/ML
LUTEINIZING HORMONE: 11.1 MIU/ML

## 2021-06-21 PROCEDURE — 36415 COLL VENOUS BLD VENIPUNCTURE: CPT | Performed by: NURSE PRACTITIONER

## 2021-07-21 ENCOUNTER — TELEPHONE (OUTPATIENT)
Dept: PULMONOLOGY | Age: 41
End: 2021-07-21

## 2021-07-21 ENCOUNTER — VIRTUAL VISIT (OUTPATIENT)
Dept: PULMONOLOGY | Age: 41
End: 2021-07-21
Payer: COMMERCIAL

## 2021-07-21 DIAGNOSIS — G47.33 MILD OBSTRUCTIVE SLEEP APNEA: Primary | ICD-10-CM

## 2021-07-21 DIAGNOSIS — J45.20 MILD INTERMITTENT ASTHMA, UNSPECIFIED WHETHER COMPLICATED: ICD-10-CM

## 2021-07-21 DIAGNOSIS — G47.10 HYPERSOMNIA: ICD-10-CM

## 2021-07-21 DIAGNOSIS — R53.83 FATIGUE, UNSPECIFIED TYPE: ICD-10-CM

## 2021-07-21 DIAGNOSIS — F45.8 BRUXISM: ICD-10-CM

## 2021-07-21 PROCEDURE — 99214 OFFICE O/P EST MOD 30 MIN: CPT | Performed by: INTERNAL MEDICINE

## 2021-07-21 ASSESSMENT — SLEEP AND FATIGUE QUESTIONNAIRES
HOW LIKELY ARE YOU TO NOD OFF OR FALL ASLEEP WHEN YOU ARE A PASSENGER IN A CAR FOR AN HOUR WITHOUT A BREAK: 0
HOW LIKELY ARE YOU TO NOD OFF OR FALL ASLEEP WHILE LYING DOWN TO REST IN THE AFTERNOON WHEN CIRCUMSTANCES PERMIT: 3
HOW LIKELY ARE YOU TO NOD OFF OR FALL ASLEEP WHILE SITTING INACTIVE IN A PUBLIC PLACE: 0
HOW LIKELY ARE YOU TO NOD OFF OR FALL ASLEEP WHILE SITTING AND READING: 3
HOW LIKELY ARE YOU TO NOD OFF OR FALL ASLEEP IN A CAR, WHILE STOPPED FOR A FEW MINUTES IN TRAFFIC: 0
HOW LIKELY ARE YOU TO NOD OFF OR FALL ASLEEP WHILE SITTING QUIETLY AFTER LUNCH WITHOUT ALCOHOL: 1
HOW LIKELY ARE YOU TO NOD OFF OR FALL ASLEEP WHILE WATCHING TV: 3
ESS TOTAL SCORE: 10
HOW LIKELY ARE YOU TO NOD OFF OR FALL ASLEEP WHILE SITTING AND TALKING TO SOMEONE: 0

## 2021-07-21 NOTE — PROGRESS NOTES
[facility]                                                          TELEHEALTH EVALUATION: Service performed was Audio/Visual (During FBWLY-71 public health emergency) and not a face-to-face visit           CHIEF COMPLAINT: Follow-up SHANICE      HPI:   HST was reviewed by me and noted below. Results were dicussed with patient and multiple good questions were answered. Patient started CPAP. Feels the world difference. Started a new mask. Dreaming a lot and more refreshed in am. Sleeps 10-11 hrs. Feels much better overall. Patient is using CPAP 7-8  hrs/night. Using humidifier. No snoring on CPAP. The pressure is well tolerated. The mask is comfortable. No mask leak. No significant daytime sleepiness. No nodding off when driving. Bedtime is 9:30 pm and rise time is 6 am. Sleep onset is 10 minutes. ESS is 10. Past Medical History:   Diagnosis Date    Allergic rhinitis     Anxiety     Asthma     Class 2 obesity without serious comorbidity with body mass index (BMI) of 35.0 to 35.9 in adult 5/20/2021    Congenital anomaly, optic nerve, right ([facility] [address]     Constipation     Depression     Family history of colon cancer     Gets colonoscopy    Family history of ovarian cancer     Family history of thyroid cancer     Herpes simplex 2013    type2 on culture done by GYN    Hyperlipidemia     Irritable bowel syndrome        Past Surgical History:        Procedure Laterality Date    COLONOSCOPY  2010    ELBOW FRACTURE SURGERY Left 10/28/2016    INCONTINENCE SURGERY  2019    KNEE SURGERY      lef t    TONSILLECTOMY         Allergies:  is allergic to diphenhydramine, other, and penicillins. Social History:    TOBACCO:   reports that she quit smoking about 11 years ago. Her smoking use included cigarettes. She has a 6.00 pack-year smoking history. She has never used smokeless tobacco.  ETOH:   reports current alcohol use.       Family History:       Problem Relation Age of Onset    Cancer Paternal Grandfather         multiple NMSC    Cancer Mother         thyroid, colon,ovarian,    Ulcerative Colitis Mother     Cancer Father         skin       Current Medications:    Current Outpatient Medications:     albuterol sulfate HFA (PROAIR HFA) 108 (90 Base) MCG/ACT inhaler, Inhale 2 puffs into the lungs every 6 hours as needed for Wheezing or Shortness of Breath, Disp: , Rfl:     hydrOXYzine (VISTARIL) 50 MG capsule, 50 mg daily , Disp: , Rfl:     NUVARING 0.12-0.015 MG/24HR vaginal ring, , Disp: , Rfl:     Multiple Vitamins-Minerals (MULTIVITAMIN ADULT PO), Take by mouth, Disp: , Rfl:     Cetirizine HCl (ZYRTEC ALLERGY) 10 MG CAPS, Take  by mouth., Disp: , Rfl:     acyclovir (ZOVIRAX) 400 MG tablet, Take 400 mg by mouth 2 times daily. , Disp: , Rfl:     Linaclotide (LINZESS) 290 MCG CAPS, Take  by mouth., Disp: , Rfl:         Objective:   PHYSICAL EXAM:    There were no vitals taken for this visit.' on RA  Mallampati class IV. Constitutional:  No acute distress. Appears well developed and nourished. Eyes: No sclera icterus. EOM intact. No visible discharge. HENT: Head is normocephalic and atraumatic. Mucus membranes are moist and the tongue appears normal. Normal appearing nose. External Ears normal.   Neck: No visualized mass. Oval Ar is midline   Resp: No accessory muscle use. Respiratory effort normal. No visualized signs of difficulty breathing or respiratory distress. Cardiovascular: No LE edema. Musculoskeletal: Normal gait with no signs of ataxia. Normal range of motion of the neck. Skin: No significant exanthematous lesions or discoloration noted on facial skin    Neuro: Awake. Alert. Able to follow commands. No facial asymmetry. No gaze palsy. Psych: No agitation. Normal affect. No hallucinations. Oriented to person/time/place. No anxiety. Normal judgement and insight.                 DATA reviewed by me:   HST 5/27/2021 AHI 5 and desaturation to 82%      CPAP data 06/21-07/20 2021 reviewed by me. Uses 7-8  hrs/night with 90% compliance and AHI of  1.1. Median pressure of 9.5 cmH2O, 95th pressure of  12.1 cmH2O. CPAP cmH2O      Assessment:       · Mild SHANICE. APAP 8-16 cmH2O. Optimal compliance and efficacy upon review today. · Hypersomnia and Fatigue - improved   · Bruxism  · Mild intermittent asthma on albuterol as needed      Plan:       · Change APAP 10-14 cmH2O   · Advised to use CPAP 6-8 hrs at night and during naps. · Replacement of mask, tubing, head straps every 3-6 months or sooner if damaged. · Follow up CPAP compliance and pressure adjustment if needed  · Sleep hygiene  · Avoid sedatives, alcohol and caffeinated drinks at bed time. · No driving motorized vehicles or operating heavy machinery while fatigue, drowsy or sleepy. · Weight loss is also recommended as a long-term intervention. · Complications of SHANICE if not treated were discussed with patient patient to include systemic hypertension, pulmonary hypertension, cardiovascular morbidities, car accidents and all cause mortality. · Continue albuterol as needed  · Follow up in 1 year or sooner if needed           Daisy Ramos is a 41 y.o. female being evaluated by a Virtual Visit (video visit) encounter to address concerns as mentioned above. A caregiver was present when appropriate. Due to this being a TeleHealth encounter ([facility]), evaluation of the following organ systems was limited: Vitals/Constitutional/EENT/Resp/CV/GI//MS/Neuro/Skin/Heme-Lymph-Imm.   Pursuant to the emergency declaration under the [facility], [address] [place] [address] [facility] authority and the [employer] and [employer] Act, this Virtual Visit was conducted with patient's (and/or legal guardian's) consent, to reduce the patient's risk of exposure to COVID-19 and provide necessary medical care.  The patient (and/or legal guardian) has also been advised to contact this office for worsening conditions or problems, and seek emergency medical treatment and/or call 911 if deemed necessary. Services were provided through a video synchronous discussion virtually to substitute for in-person clinic visit. Patient was located in her home, provider was located in his office. --Joanie Conner MD on 7/21/2021 at 11:35 AM    An electronic signature was used to authenticate this note.

## 2021-07-21 NOTE — TELEPHONE ENCOUNTER
Patient needs scheduled for 1 year fua. Left message asking patient to return call to office. See appt desk    LOV: 7/25/21    Assessment:       · Snoring  · Hypersomnia and Fatigue   · Bruxism  · Mild intermittent asthma on albuterol as needed      Plan:       · HST evaluate for sleep related breathing disorder. · Treatment options were discussed with patient if HST reveals SHANICE, including CPAP therapy, oral appliances, upper airway surgery and hypoglossal nerve stimulation. · APAP min pressure of 8 and max pressure of 16 cmH2O if positive HST   · Discussed with patient the pathophysiology of apnea. · Sleep hygiene  · Avoid sedatives, alcohol and caffeinated drinks at bed time. · No driving motorized vehicles or operating heavy machinery while fatigue, drowsy or sleepy. · Weight loss is also recommended as a long-term intervention. · Continue with mouthguard.     · Complications of SHANICE if not treated were discussed with patient patient to include systemic hypertension, pulmonary hypertension, cardiovascular morbidities, car accidents and all cause mortality.

## 2021-08-31 ENCOUNTER — TELEPHONE (OUTPATIENT)
Dept: FAMILY MEDICINE CLINIC | Age: 41
End: 2021-08-31

## 2021-08-31 NOTE — TELEPHONE ENCOUNTER
Pt called stating that she was at a convention last Tuesday. She ate at a [employer], that night she was vomiting and had diarrhea. States that the vomiting stopped but the diarrhea has continued, then started having severe stomach cramps last night with the diarrhea. 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 Pt states she just feels terrible. Pt has been vaccinated as well.  Call back pt with recommendations [phone]

## 2021-10-18 ENCOUNTER — TELEPHONE (OUTPATIENT)
Dept: FAMILY MEDICINE CLINIC | Age: 41
End: 2021-10-18

## 2021-10-18 DIAGNOSIS — Z76.89 ENCOUNTER TO ESTABLISH CARE WITH NEW DOCTOR: Primary | ICD-10-CM

## 2021-10-18 NOTE — TELEPHONE ENCOUNTER
----- Message from [facility] sent at 10/18/2021  1:03 PM EDT -----  Subject: Referral Request    QUESTIONS   Reason for referral request? pt needs another referral for dermatology for   the same office. Her current dermatologist left and the new doctor needs a   referral. Fax? [phone]   Has the physician seen you for this condition before? No   Preferred Specialist (if applicable)? Do you already have an appointment scheduled? No  Additional Information for Provider? Call pt with referral  ---------------------------------------------------------------------------  --------------  CALL BACK INFO  What is the best way for the office to contact you? OK to leave message on   voicemail  Preferred Call Back Phone Number?  [phone]

## 2021-11-11 ENCOUNTER — TELEPHONE (OUTPATIENT)
Dept: PULMONOLOGY | Age: 41
End: 2021-11-11

## 2021-11-11 DIAGNOSIS — F45.8 BRUXISM: ICD-10-CM

## 2021-11-11 DIAGNOSIS — J45.20 MILD INTERMITTENT ASTHMA, UNSPECIFIED WHETHER COMPLICATED: ICD-10-CM

## 2021-11-11 DIAGNOSIS — R53.83 FATIGUE, UNSPECIFIED TYPE: ICD-10-CM

## 2021-11-11 DIAGNOSIS — G47.33 MILD OBSTRUCTIVE SLEEP APNEA: Primary | ICD-10-CM

## 2021-11-11 DIAGNOSIS — G47.10 HYPERSOMNIA: ICD-10-CM

## 2021-11-11 NOTE — TELEPHONE ENCOUNTER
Patient called stating that her pressures feel too low and would like the pressures increased. Please advise      Compliance scanned into review      Assessment:       · Mild SHANICE. APAP 8-16 cmH2O. Optimal compliance and efficacy upon review today. · Hypersomnia and Fatigue - improved   · Bruxism  · Mild intermittent asthma on albuterol as needed      Plan:       · Change APAP 10-14 cmH2O   · Advised to use CPAP 6-8 hrs at night and during naps. · Replacement of mask, tubing, head straps every 3-6 months or sooner if damaged. · Follow up CPAP compliance and pressure adjustment if needed  · Sleep hygiene  · Avoid sedatives, alcohol and caffeinated drinks at bed time. · No driving motorized vehicles or operating heavy machinery while fatigue, drowsy or sleepy. · Weight loss is also recommended as a long-term intervention. · Complications of SHANICE if not treated were discussed with patient patient to include systemic hypertension, pulmonary hypertension, cardiovascular morbidities, car accidents and all cause mortality. · Continue albuterol as needed  · Follow up in 1 year or sooner if needed               Sahil Downing is a 41 y.o. female being evaluated by a Virtual Visit (video visit) encounter to address concerns as mentioned above.  A caregiver was present when appropriate.  Due to this being a TeleHealth encounter (During [facility] public health emergency), evaluation of the following organ systems was limited: Vitals/Constitutional/EENT/Resp/CV/GI//MS/Neuro/Skin/Heme-Lymph-Imm.  Pursuant to the emergency declaration under the [facility], [address] waiver authority and the [employer] and [employer] Act, this Virtual Visit was conducted with patient's (and/or legal guardian's) consent, to reduce the patient's risk of exposure to COVID-19 and provide necessary medical care.  The patient (and/or legal guardian) has also been advised to contact this office for worsening conditions or problems, and seek emergency medical treatment and/or call 911 if deemed necessary.     Services were provided through a video synchronous discussion virtually to substitute for in-person clinic visit.  Patient was located in her home, provider was located in his office.     --Chris Swenson MD on 7/21/2021 at 11:35 AM     An electronic signature was used to authenticate this note.

## 2021-11-12 NOTE — TELEPHONE ENCOUNTER
Spoke with Codey Qiu at [facility] they did receive order but it has not yet been changed.  Will follow up

## 2022-08-16 ENCOUNTER — OFFICE VISIT (OUTPATIENT)
Dept: PULMONOLOGY | Age: 42
End: 2022-08-16
Payer: COMMERCIAL

## 2022-08-16 VITALS
WEIGHT: 207 LBS | SYSTOLIC BLOOD PRESSURE: 118 MMHG | HEIGHT: 64 IN | BODY MASS INDEX: 35.34 KG/M2 | DIASTOLIC BLOOD PRESSURE: 78 MMHG | OXYGEN SATURATION: 98 % | HEART RATE: 79 BPM

## 2022-08-16 DIAGNOSIS — F32.A UNCONTROLLED DEPRESSION: ICD-10-CM

## 2022-08-16 DIAGNOSIS — G47.10 HYPERSOMNIA: ICD-10-CM

## 2022-08-16 DIAGNOSIS — G47.33 MILD OBSTRUCTIVE SLEEP APNEA: Primary | ICD-10-CM

## 2022-08-16 PROCEDURE — 99214 OFFICE O/P EST MOD 30 MIN: CPT | Performed by: INTERNAL MEDICINE

## 2022-08-16 RX ORDER — LIRAGLUTIDE 6 MG/ML
INJECTION, SOLUTION SUBCUTANEOUS
COMMUNITY
Start: 2022-08-02

## 2022-08-16 ASSESSMENT — SLEEP AND FATIGUE QUESTIONNAIRES
ESS TOTAL SCORE: 14
HOW LIKELY ARE YOU TO NOD OFF OR FALL ASLEEP WHILE SITTING AND TALKING TO SOMEONE: 0
HOW LIKELY ARE YOU TO NOD OFF OR FALL ASLEEP WHEN YOU ARE A PASSENGER IN A CAR FOR AN HOUR WITHOUT A BREAK: 2
HOW LIKELY ARE YOU TO NOD OFF OR FALL ASLEEP WHILE LYING DOWN TO REST IN THE AFTERNOON WHEN CIRCUMSTANCES PERMIT: 3
HOW LIKELY ARE YOU TO NOD OFF OR FALL ASLEEP WHILE SITTING INACTIVE IN A PUBLIC PLACE: 0
HOW LIKELY ARE YOU TO NOD OFF OR FALL ASLEEP WHILE WATCHING TV: 3
HOW LIKELY ARE YOU TO NOD OFF OR FALL ASLEEP IN A CAR, WHILE STOPPED FOR A FEW MINUTES IN TRAFFIC: 0
HOW LIKELY ARE YOU TO NOD OFF OR FALL ASLEEP WHILE SITTING QUIETLY AFTER LUNCH WITHOUT ALCOHOL: 3
HOW LIKELY ARE YOU TO NOD OFF OR FALL ASLEEP WHILE SITTING AND READING: 3

## 2022-08-16 NOTE — PROGRESS NOTES
[facility]          CHIEF COMPLAINT: Follow-up SHANICE      HPI:   Doing well with CPAP. Uses every night. At times pulls mask off. Patient is using CPAP 7-8  hrs/night. Using humidifier. No snoring on CPAP. The pressure is well tolerated. The mask is comfortable. No mask leak. Still sleepy during the day. No nodding off when driving. Bedtime is 9:30 pm and rise time is 6:30-7 am. Sleep onset is 5 seconds. Wakes up 1-2 times/night, 10  min to fall back a sleep. 3 naps/week for 1 hr. ESS is 14. Depression is better, but not well controlled. No restless feelings in legs at night. No loss of muscle strength when angry or laugh. No hallucination when dozing off or waking up from sleep. No paralysis upon awakening from sleep or going to sleep.   + nightmares      Past Medical History:   Diagnosis Date    Allergic rhinitis     Anxiety     Asthma     Class 2 obesity without serious comorbidity with body mass index (BMI) of 35.0 to 35.9 in adult 5/20/2021    Congenital anomaly, optic nerve, right (HCC)     Constipation     Depression     Family history of colon cancer     Gets colonoscopy    Family history of ovarian cancer     Family history of thyroid cancer     Herpes simplex 2013    type2 on culture done by GYN    Hyperlipidemia     Irritable bowel syndrome        Past Surgical History:        Procedure Laterality Date    COLONOSCOPY  2010    ELBOW FRACTURE SURGERY Left 10/28/2016    INCONTINENCE SURGERY  2019    KNEE SURGERY      lef t    TONSILLECTOMY         Allergies:  is allergic to diphenhydramine, other, and penicillins. Social History:    TOBACCO:   reports that she quit smoking about 12 years ago. Her smoking use included cigarettes. She has a 6.00 pack-year smoking history. She has never used smokeless tobacco.  ETOH:   reports current alcohol use.       Family History:       Problem Relation Age of Onset    Cancer Paternal Grandfather         multiple NMSC    Cancer Mother         thyroid, colon,ovarian,    Ulcerative Colitis Mother     Cancer Father         skin       Current Medications:    Current Outpatient Medications:     SAXENDA 18 MG/3ML SOPN, INJECT UNDER SKIN 0.6 MG DAILY FOR 7 DAYS THEN INCREASE BY 0.6 MG EVERY WEEK TIL AT 3MG DAILY DOSE, Disp: , Rfl:     sertraline (ZOLOFT) 50 MG tablet, TAKE 1 TABLET BY MOUTH EVERY DAY, Disp: , Rfl:     albuterol sulfate HFA (PROVENTIL;VENTOLIN;PROAIR) 108 (90 Base) MCG/ACT inhaler, Inhale 2 puffs into the lungs every 6 hours as needed for Wheezing or Shortness of Breath, Disp: , Rfl:     hydrOXYzine (VISTARIL) 50 MG capsule, 50 mg daily , Disp: , Rfl:     NUVARING 0.12-0.015 MG/24HR vaginal ring, , Disp: , Rfl:     Multiple Vitamins-Minerals (MULTIVITAMIN ADULT PO), Take by mouth, Disp: , Rfl:     Cetirizine HCl 10 MG CAPS, Take  by mouth., Disp: , Rfl:     acyclovir (ZOVIRAX) 400 MG tablet, Take 400 mg by mouth 2 times daily. , Disp: , Rfl:     linaclotide (LINZESS) 290 MCG CAPS capsule, Take  by mouth., Disp: , Rfl:         Objective:   PHYSICAL EXAM:    Blood pressure 118/78, pulse 79, height 5' 4\" (1.626 m), weight 207 lb (93.9 kg), SpO2 98 %.' on RA  Gen: No distress. Obese. BMI of 35.53  Eyes: PERRL. No sclera icterus. No conjunctival injection. ENT: No discharge. Pharynx clear. Mallampati class IV. Neck: Trachea midline. No obvious mass. Neck circumference \"  Resp: No accessory muscle use. No crackles. No wheezes. No rhonchi. No dullness on percussion. Good air entry. CV: Regular rate. Regular rhythm. No murmur or rub. No edema. GI: Non-tender. Non-distended. No hernia. Skin: Warm and dry. No nodule on exposed extremities. Lymph: No cervical LAD. No supraclavicular LAD. M/S: No cyanosis. No joint deformity. No clubbing. Neuro: Awake. Alert. Moves all four extremities. Psych: Oriented x 3. No anxiety.                    DATA reviewed by me:   HST 5/27/2021 AHI 5 and desaturation to 82%      CPAP data 06/21-07/20 2021 reviewed by me. Uses 7-8  hrs/night with 90% compliance and AHI of  1.1. Median pressure of 9.5 cmH2O, 95th pressure of  12.1 cmH2O. CPAP cmH2O    CPAP data 07/11-08/09 2022 reviewed by me. Uses 7-8  hrs/night with 87% compliance and AHI of  0.2. Median pressure of 11.3 cmH2O, 95th pressure of  12.7 cmH2O. CPAP cmH2O      Assessment:       Mild SHANICE. APAP 11-15 cmH2O. Optimal compliance and efficacy upon review today. Hypersomnia and Fatigue - improving with residual daytime sleepiness. Probably uncontrolled depression is contributing. Bruxism  Mild intermittent asthma on albuterol as needed      Plan:       Order for CPAP supplies  Continue CPAP 6-8 hrs at night and during naps. Replacement of mask, tubing, head straps every 3-6 months or sooner if damaged. Follow up CPAP compliance and pressure adjustment if needed  Sleep hygiene  Patient will discuss uncontrolled depression with PCP for better control. Will consider CPAP followed MSLT if fail to improve. Avoid sedatives, alcohol and caffeinated drinks at bed time. No driving motorized vehicles or operating heavy machinery while fatigue, drowsy or sleepy. Weight loss is also recommended as a long-term intervention. Discussed with patient educational, supportive and behavioral interventions to improve CPAP compliance.   Follow up in 1 year or sooner if needed

## 2023-08-16 ENCOUNTER — OFFICE VISIT (OUTPATIENT)
Dept: PULMONOLOGY | Age: 43
End: 2023-08-16
Payer: COMMERCIAL

## 2023-08-16 VITALS
SYSTOLIC BLOOD PRESSURE: 122 MMHG | DIASTOLIC BLOOD PRESSURE: 74 MMHG | OXYGEN SATURATION: 98 % | HEART RATE: 75 BPM | HEIGHT: 64 IN | BODY MASS INDEX: 31.41 KG/M2 | RESPIRATION RATE: 16 BRPM | WEIGHT: 184 LBS

## 2023-08-16 DIAGNOSIS — J45.20 MILD INTERMITTENT ASTHMA, UNSPECIFIED WHETHER COMPLICATED: ICD-10-CM

## 2023-08-16 DIAGNOSIS — G47.33 MILD OBSTRUCTIVE SLEEP APNEA: Primary | ICD-10-CM

## 2023-08-16 DIAGNOSIS — G47.10 HYPERSOMNIA: ICD-10-CM

## 2023-08-16 PROCEDURE — 99214 OFFICE O/P EST MOD 30 MIN: CPT | Performed by: INTERNAL MEDICINE

## 2023-08-16 ASSESSMENT — SLEEP AND FATIGUE QUESTIONNAIRES
HOW LIKELY ARE YOU TO NOD OFF OR FALL ASLEEP WHILE SITTING AND READING: 0
ESS TOTAL SCORE: 10
HOW LIKELY ARE YOU TO NOD OFF OR FALL ASLEEP WHEN YOU ARE A PASSENGER IN A CAR FOR AN HOUR WITHOUT A BREAK: 3
NECK CIRCUMFERENCE (INCHES): 13
HOW LIKELY ARE YOU TO NOD OFF OR FALL ASLEEP WHILE SITTING AND TALKING TO SOMEONE: 0
HOW LIKELY ARE YOU TO NOD OFF OR FALL ASLEEP WHILE LYING DOWN TO REST IN THE AFTERNOON WHEN CIRCUMSTANCES PERMIT: 3
HOW LIKELY ARE YOU TO NOD OFF OR FALL ASLEEP IN A CAR, WHILE STOPPED FOR A FEW MINUTES IN TRAFFIC: 0
HOW LIKELY ARE YOU TO NOD OFF OR FALL ASLEEP WHILE SITTING INACTIVE IN A PUBLIC PLACE: 0
HOW LIKELY ARE YOU TO NOD OFF OR FALL ASLEEP WHILE SITTING QUIETLY AFTER LUNCH WITHOUT ALCOHOL: 1
HOW LIKELY ARE YOU TO NOD OFF OR FALL ASLEEP WHILE WATCHING TV: 3

## 2023-08-16 NOTE — PROGRESS NOTES
[facility]          CHIEF COMPLAINT: Follow-up SHANICE      HPI:   Doing well with her CPAP. Feels better. Patient is using CPAP 8-9  hrs/night. Using humidifier. No snoring on CPAP. The pressure is well tolerated. The mask is comfortable. No mask leak. No daytime sleepiness. No nodding off when driving. Bedtime is 9:30 pm and rise time is 7 am. Sleep onset is 2 seconds. Takes naps on weekend. Overall doing better and her depression feels better controlled. ESS 10. Past Medical History:   Diagnosis Date    Allergic rhinitis     Anxiety     Asthma     Class 2 obesity without serious comorbidity with body mass index (BMI) of 35.0 to 35.9 in adult 5/20/2021    Congenital anomaly, optic nerve, right (HCC)     Constipation     Depression     Family history of colon cancer     Gets colonoscopy    Family history of ovarian cancer     Family history of thyroid cancer     Herpes simplex 2013    type2 on culture done by GYN    Hyperlipidemia     Irritable bowel syndrome        Past Surgical History:        Procedure Laterality Date    COLONOSCOPY  2010    ELBOW FRACTURE SURGERY Left 10/28/2016    INCONTINENCE SURGERY  2019    KNEE SURGERY      lef t    TONSILLECTOMY         Allergies:  is allergic to diphenhydramine, other, and penicillins. Social History:    TOBACCO:   reports that she quit smoking about 13 years ago. Her smoking use included cigarettes. She has a 6.00 pack-year smoking history. She has been exposed to tobacco smoke. She has never used smokeless tobacco.  ETOH:   reports current alcohol use.       Family History:       Problem Relation Age of Onset    Cancer Paternal Grandfather         multiple NMSC    Cancer Mother         thyroid, colon,ovarian,    Ulcerative Colitis Mother     Cancer Father         skin       Current Medications:    Current Outpatient Medications:     SAXENDA 18 MG/3ML SOPN, INJECT UNDER SKIN 0.6 MG DAILY FOR

## 2023-11-13 NOTE — PROGRESS NOTES
"Subjective:     Star Centeno is a 82 y.o. male who presents for Hospital Follow-up.    Transitional Care Management  TCM Outreach Date and Time: Filed (11/7/2023 10:12 AM)    Discharge Questions  Actual Discharge Date: 11/06/23  Now that you are home, how are you feeling?: Good  Did you receive any new prescriptions?: Yes  Were you able to get them filled?: Yes  Meds to Bed or Pharmacy filled?: Pharmacy  Do you have any questions about your current medications or new medications (Review Med Rec)?: No  Do you have a follow up appointment scheduled with your PCP?: Yes  Appointment Date: 11/13/23  Appointment Time: 1120  Any issues or paperwork you wish to discuss with your PCP?: No  Does this patient qualify for the CCM program?: Yes (routed to Radha Dos Santos RN. Unable to enroll, patient on service with )    Transitional Care  Number of attempts made to contact patient: 1  Current or previous attempts competed within two business days of discharge? : Yes  Provided education regarding treatment plan, medications, self-management, ADLs?: Yes ( has started service. Advised patient to contact  on call for any catheter related issues or questions. Has f/u scheduled w/ Urology.)  Has patient completed an Advanced Directive?: No  Has the Care Manager's phone number provided?: No  Is there anything else I can help you with?: No    Discharge Summary  Chief Complaint: Urinary Catheter Problem  Admitting Diagnosis: Gross hematuria (599.71 (ICD-9-CM))  Discharge Diagnosis: Gross Hematuria        HPI:   Recently hospitalized for going symptoms of hematuria.  Hospital discharge summary as below:    \"This is a 82 y.o. male with a past medical history of prostate cancer s/p radiation, hyperlipidemia, aortic aneurysm, history of pulmonary embolism was on anticoagulation with Xarelto, but had a recent biopsy on 10/25 presenting with hematuria, urinary retention and severe pain.  Anticoagulation was stopped.  Hedrick " 2020    TELEHEALTH EVALUATION -- Audio/Visual (During KNSIA-06 public health emergency)    Chief Complaint   Patient presents with   [address]     pt was a pt of tobi        HPI:  Russel Kim (:  1980) has requested an audio/video evaluation for the following concern(s): This is a 41year old female being seen to establish care. Her last PCP was at [facility]  She was last seen 2.5 months ago for weight gain. 4 pounds every 3-4 days  Jittery, tired, shaky. Just didn't feel right. glucose 102  T3 187  (???) unsure what TSH was and unsure what other labs done. But was told her thyroid was \"abnormal\"  Patient states she feels much better now. She started going to figure weight loss. She was prescribed benzophetamine and has lost 13 pounds and states her labs at weight loss clinic were \"normal\"  Going to gym 5 days a week now    Mom had thyroid cancer, ovarian  and colon cancer  Patient states she had genetic testing with ob/gyn at [address] [place] [address] and was \"negative\"    Has history of following diagnosis:    IBS with constipation and history of colon polyps and + [facility] colon cancer in mother  Patient had colonoscopy  with Dr Junior Schuster? GI and needs one every 5 years now. Patient states she had polyps on colonoscopy in 2017  Has chronic constipation and on linzess  X 4 years and no side effects. She states the medication works well and wants to continue. On acyclovir about once a week for fever blisters. 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 The patient also has had a previous episode of genital herpes in . She is Unsure if this was type 1 transplanted or type 2 genital herpes. She has never had another genital out break since.   She states she uses the acyclovir mostly for cold sores     Uses hydroxyzine bid for anxiety and feels that she does "catheter was placed and started on bladder irrigation.  Symptoms gradually improved.  The patient had another bleed, obstructing clots after restarting Xarelto.  Hematuria stopped, Hedrcik catheter was removed. The patient and daughter want to stop Xarelto.  They understand and accept the risk of their decision.  Venous ultrasound did not show evidence for clot.  Bleeding stopped after Xarelto was discontinued however the patient also had another urinary outflow obstruction and required Hedrick catheter placement again.  Patient was started on tamsulosin that did not help with pain and urine flow.  His metoprolol was discontinued due to hypotension. \"    -Patient states that it is doing well.  Has noticed a significant improvement in the hematuria; however, continues to struggle some with the Hedrick catheter.  He does occasionally have some urinary incontinence around the catheter.  He does continue to follow-up closely with urology.  Plans for stent removal later today.  -Patient denies any fevers, chills, abdominal pain, hematuria, dysuria at this time.    -Patient does state that he has been having some difficulty with increased fatigue, lightheadedness.  During hospitalization metoprolol stopped due to low blood pressure.  Low blood pressure was noted by home health care team as well.  Blood pressure today at 94/52.  Currently patient denies any lightheadedness, dizziness, syncopal episodes or presyncope.    Current medicines (including reconciliation performed today)  Current Outpatient Medications   Medication Sig Dispense Refill    Empagliflozin (JARDIANCE) 25 MG Tab Take 25 mg by mouth every day. Indications: Type 2 Diabetes      bismuth subsalicylate (PEPTO-BISMOL) 262 MG/15ML Suspension Take 30 mL by mouth every 6 hours as needed (diarrhea). Indications: Diarrhea, Heartburn      Loperamide HCl (IMODIUM A-D) 1 MG/7.5ML Liquid Take 30 mL by mouth 2 times a day as needed (diarrhea). Indications: Diarrhea      " Melatonin 10 MG Cap Take 10 mg by mouth at bedtime as needed (sleep). Indications: sleep      tamsulosin (FLOMAX) 0.4 MG capsule Take 1 Capsule by mouth 1/2 hour after breakfast. 30 Capsule 1    acetaminophen (TYLENOL) 500 MG Tab Take 1 Tablet by mouth every 6 hours as needed for Mild Pain or Moderate Pain.      diclofenac sodium (VOLTAREN) 1 % Gel Apply 2 g topically 4 times a day as needed (Shoulder Pain). Indications: Joint Damage causing Pain and Loss of Function      rosuvastatin (CRESTOR) 5 MG Tab TAKE 1 TABLET BY MOUTH EVERY  Tablet 3    lisinopril (PRINIVIL) 20 MG Tab Take 1 Tablet by mouth every day. 100 Tablet 3    Empagliflozin 25 MG Tab Take 25 mg by mouth every day for 360 days. 100 Tablet 3    ezetimibe (ZETIA) 10 MG Tab Take 1 Tablet by mouth every day. 100 Tablet 0    metFORMIN (GLUCOPHAGE) 500 MG Tab Take 1 Tablet by mouth 2 times a day with meals for 360 days. 200 Tablet 1    Coenzyme Q10 (COQ-10) 30 MG Cap Take 30 mg by mouth every evening. Indications: heart health      Omega-3 Fatty Acids (FISH OIL) 1200 MG Cap Take 1,200 mg by mouth every morning. Indications: heart health      aspirin 81 MG EC tablet Take 81 mg by mouth every evening. Indications: blood thinner      Prenatal Multivit-Min-Fe-FA (PRE-COLE FORMULA PO) Take 1 Tablet by mouth every morning. Indications: supplement       No current facility-administered medications for this visit.       Allergies:   Patient has no known allergies.    Social History     Tobacco Use    Smoking status: Former     Current packs/day: 0.00     Average packs/day: 1 pack/day for 45.0 years (45.0 ttl pk-yrs)     Types: Cigarettes     Start date: 1956     Quit date: 2001     Years since quittin.8    Smokeless tobacco: Never   Vaping Use    Vaping Use: Never used   Substance Use Topics    Alcohol use: Yes     Alcohol/week: 8.4 oz     Types: 14 Glasses of wine per week     Comment: 2 glasses of wine daily    Drug use: Never       ROS:  -See  "HPI    Objective:     Vitals:    11/13/23 1102   BP: 94/52   Pulse: 73   Resp: 16   Temp: 36.1 °C (97 °F)   TempSrc: Temporal   SpO2: 94%   Weight: 81.6 kg (180 lb)   Height: 1.803 m (5' 10.98\")     Body mass index is 25.12 kg/m².    Physical Exam:  Constitutional: Alert, no distress, well-groomed.  Skin: No rashes in visible areas.  Eye: Round. Conjunctiva clear, lids normal. No icterus.   ENMT: Lips pink without lesions, good dentition, moist mucous membranes. Phonation normal.  Neck: No masses, no thyromegaly. Moves freely without pain.  Respiratory: Unlabored respiratory effort, no cough or audible wheeze  Psych: Alert and oriented x3, normal affect and mood.    Assessment and Plan:     1. Hospital discharge follow-up  - Chart and discharge summary were reviewed.   - Hospitalization and results reviewed with patient.   - Medications reviewed including instructions regarding high risk medications, dosing and side effects.  - Recommended Services: No services needed at this time  - Advance directive/POLST on file?  Yes    2. Hydronephrosis, unspecified hydronephrosis type  -Since patient is stable at this time; however, further follow-up with urology is needed for stent removal.  Reviewed hospitalization, imaging with patient.  Is still uncertain as to the exact etiology of the hydronephrosis.  There is some concerns regarding potential neoplastic causes.  We will continue to follow-up with urology for further evaluation and treatment.    3. Essential hypertension, benign  -Given continued concerns for hypotension we will decrease lisinopril from 20 mg to 10.  He will continue to be checking blood pressures, monitor for concerning symptoms, follow-up as needed.  - lisinopril (PRINIVIL) 10 MG Tab; Take 1 Tablet by mouth every day.  Dispense: 90 Tablet; Refill: 3        Follow-up:No follow-ups on file.    Face-to-face transitional care management services with [facility] (today's visit is within days post discharge & " LACE+ score 59+) medical decision complexity were provided.            [x] No significant exanthematous lesions or discoloration noted on facial skin         [] Abnormal-            Psychiatric:       [x] Normal Affect [x] No Hallucinations        [] Abnormal-     Other pertinent observable physical exam findings-     ASSESSMENT/PLAN:  1. Abnormal thyroid screen (blood)  Per patient report but does not have specific lab values  2017 normal tsh  Will request records from [facility] to review labs and determine if further lab testing is needed  Consider thyroid ultrasound since mother with thyroid cancer  Symptoms of fatigue and weight gain improved but is on amphetamine for weight loss. 2. Irritable bowel syndrome with constipation  Continue with linzess as prescribed    3. Non-seasonal allergic rhinitis, unspecified trigger  Continue singulair and zyrtec    4. Mild intermittent asthma without complication  Continue with albuterol prn    5. Herpes simplex  Found record of hsv typing from 4/16/2013 and patient was positive for HSV-2  Consider routine/prophylaxis vs episodic therapy    6. Anxiety  Well controlled on vistaril  Discussed anticholinergic side effects     7. Family history of colon cancer  Continue screening per GI recommendations   Request genetic testing done by [address] [place] [address] ob/gyn    8. Family history of thyroid cancer  See above    9. History of depression  Patient quit medications and states she is doing well    10. History of colon polyps  Last colonoscopy 2019 with no polyps  Per patient to repeat in 5 years  Request records from GI/Dr Thomas    11. Encounter to establish care with new doctor  Request records from [facility]      No follow-ups on file. Al Grullon is a 41 y.o. female being evaluated by a Virtual Visit (video visit) encounter to address concerns as mentioned above. A caregiver was present when appropriate.  Due to this being a TeleHealth encounter (During HCKYJ-29 public health emergency), evaluation of the following organ systems was limited: Vitals/Constitutional/EENT/Resp/CV/GI//MS/Neuro/Skin/Heme-Lymph-Imm. Pursuant to the emergency declaration under the [address] [place] [address], [address] [place] [address] and the [employer] Act, this Virtual Visit was conducted with patient's (and/or legal guardian's) consent, to reduce the patient's risk of exposure to COVID-19 and provide necessary medical care. The patient (and/or legal guardian) has also been advised to contact this office for worsening conditions or problems, and seek emergency medical treatment and/or call 911 if deemed necessary. Patient identification was verified at the start of the visit: Yes    Total time spent on this encounter: 40 minutes    Services were provided through a video synchronous discussion virtually to substitute for in-person clinic visit. Patient and provider were located at their individual homes. --ALLAN Simental CNP on 11/19/2020 at 2:22 PM    An electronic signature was used to authenticate this note.

## 2024-08-19 ENCOUNTER — OFFICE VISIT (OUTPATIENT)
Dept: PULMONOLOGY | Age: 44
End: 2024-08-19
Payer: COMMERCIAL

## 2024-08-19 ENCOUNTER — TELEPHONE (OUTPATIENT)
Dept: PULMONOLOGY | Age: 44
End: 2024-08-19

## 2024-08-19 VITALS
SYSTOLIC BLOOD PRESSURE: 118 MMHG | WEIGHT: 161 LBS | OXYGEN SATURATION: 99 % | BODY MASS INDEX: 28.53 KG/M2 | DIASTOLIC BLOOD PRESSURE: 76 MMHG | HEART RATE: 84 BPM | HEIGHT: 63 IN

## 2024-08-19 DIAGNOSIS — G47.33 OSA (OBSTRUCTIVE SLEEP APNEA): Primary | ICD-10-CM

## 2024-08-19 DIAGNOSIS — J45.20 MILD INTERMITTENT ASTHMA WITHOUT COMPLICATION: ICD-10-CM

## 2024-08-19 DIAGNOSIS — E66.01 MORBID OBESITY (HCC): ICD-10-CM

## 2024-08-19 PROCEDURE — 99214 OFFICE O/P EST MOD 30 MIN: CPT | Performed by: INTERNAL MEDICINE

## 2024-08-19 ASSESSMENT — SLEEP AND FATIGUE QUESTIONNAIRES
HOW LIKELY ARE YOU TO NOD OFF OR FALL ASLEEP WHILE LYING DOWN TO REST IN THE AFTERNOON WHEN CIRCUMSTANCES PERMIT: HIGH CHANCE OF DOZING
HOW LIKELY ARE YOU TO NOD OFF OR FALL ASLEEP WHILE SITTING AND READING: MODERATE CHANCE OF DOZING
HOW LIKELY ARE YOU TO NOD OFF OR FALL ASLEEP IN A CAR, WHILE STOPPED FOR A FEW MINUTES IN TRAFFIC: WOULD NEVER DOZE
HOW LIKELY ARE YOU TO NOD OFF OR FALL ASLEEP WHILE SITTING QUIETLY AFTER LUNCH WITHOUT ALCOHOL: MODERATE CHANCE OF DOZING
ESS TOTAL SCORE: 14
HOW LIKELY ARE YOU TO NOD OFF OR FALL ASLEEP WHILE WATCHING TV: HIGH CHANCE OF DOZING
HOW LIKELY ARE YOU TO NOD OFF OR FALL ASLEEP WHILE SITTING INACTIVE IN A PUBLIC PLACE: SLIGHT CHANCE OF DOZING
HOW LIKELY ARE YOU TO NOD OFF OR FALL ASLEEP WHILE SITTING AND TALKING TO SOMEONE: WOULD NEVER DOZE
HOW LIKELY ARE YOU TO NOD OFF OR FALL ASLEEP WHEN YOU ARE A PASSENGER IN A CAR FOR AN HOUR WITHOUT A BREAK: HIGH CHANCE OF DOZING

## 2024-08-19 NOTE — PROGRESS NOTES
[facility]          CHIEF COMPLAINT: Follow-up SHANICE      HPI:   Patient is using CPAP 7-8  hrs/night. Lost 65 pound son med and happy about it. Feel mask is too big now. Feels pressure is fine. No nodding off when driving. Bedtime is 9:30 am and rise time is 7:30 am. Sleep onset is 5  minutes. ESS is 1.    Not needing to use the rescue inhaler- Albuterol       Past Medical History:   Diagnosis Date    Allergic rhinitis     Anxiety     Asthma     Class 2 obesity without serious comorbidity with body mass index (BMI) of 35.0 to 35.9 in adult 5/20/2021    Congenital anomaly, optic nerve, right (HCC)     Constipation     Depression     Family history of colon cancer     Gets colonoscopy    Family history of ovarian cancer     Family history of thyroid cancer     Herpes simplex 2013    type2 on culture done by GYN    Hyperlipidemia     Irritable bowel syndrome        Past Surgical History:        Procedure Laterality Date    COLONOSCOPY  2010    ELBOW FRACTURE SURGERY Left 10/28/2016    INCONTINENCE SURGERY  2019    KNEE SURGERY      lef t    TONSILLECTOMY         Allergies:  is allergic to diphenhydramine, other, and penicillins.  Social History:    TOBACCO:   reports that she quit smoking about 14 years ago. Her smoking use included cigarettes. She started smoking about 20 years ago. She has a 6 pack-year smoking history. She has been exposed to tobacco smoke. She has never used smokeless tobacco.  ETOH:   reports current alcohol use.      Family History:       Problem Relation Age of Onset    Cancer Paternal Grandfather         multiple NMSC    Cancer Mother         thyroid, colon,ovarian,    Ulcerative Colitis Mother     Cancer Father         skin       Current Medications:    Current Outpatient Medications:     SAXENDA 18 MG/3ML SOPN, INJECT UNDER SKIN 0.6 MG DAILY FOR 7 DAYS THEN INCREASE BY 0.6 MG EVERY WEEK TIL AT 3MG DAILY DOSE, Disp: ,

## 2024-11-03 SDOH — HEALTH STABILITY: PHYSICAL HEALTH: ON AVERAGE, HOW MANY DAYS PER WEEK DO YOU ENGAGE IN MODERATE TO STRENUOUS EXERCISE (LIKE A BRISK WALK)?: 0 DAYS

## 2024-11-03 SDOH — HEALTH STABILITY: PHYSICAL HEALTH: ON AVERAGE, HOW MANY MINUTES DO YOU ENGAGE IN EXERCISE AT THIS LEVEL?: 20 MIN

## 2024-11-05 NOTE — PROGRESS NOTES
Never done    Hepatitis B vaccine (1 of 3 - 19+ 3-dose series) Never done    DTaP/Tdap/Td vaccine (7 - Td or Tdap) 12/31/2022    Lipids  11/07/2024 (Originally 9/24/2021)    COVID-19 Vaccine (3 - 2023-24 season) 11/06/2025 (Originally 9/1/2024)    Diabetes screen  11/06/2025 (Originally 5/5/2024)    Breast cancer screen  03/01/2025    Cervical cancer screen  03/01/2025    Depression Monitoring  11/06/2025    Colorectal Cancer Screen  02/22/2026    Polio vaccine  Completed    Flu vaccine  Completed    Hepatitis C screen  Completed    HIV screen  Completed    Hepatitis A vaccine  Aged Out    Hib vaccine  Aged Out    HPV vaccine  Aged Out    Meningococcal (ACWY) vaccine  Aged Out       PSH, PMH, SH and FH reviewed and noted.  Recent and past labs, tests and consults also reviewed.  Recent or new meds also reviewed.      ASSESSMENT/ PLAN  Assessment & Plan  Irritable bowel syndrome with constipation   Monitored by specialist- no acute findings meriting change in the plan. She continues with pelvic floor therapy with improvement in constipation, now having bowel movements weekly as opposed to monthly. She has taken herself off of Trulance (plecanatine) but continues Amitiza (lubiprostone). She plans to follow up with GI after pelvic floor therapy.          Need for influenza vaccination    Will administer today.    Orders:    Influenza, FLUCELVAX Trivalent, (age 6 mo+) IM, Preservative Free, 0.5mL    Alcohol use   Chronic, not at goal (unstable), discussed concerns with her alcohol use as this is greater than the recommended limit for females.  She does not feel that her alcohol use is impairing her daily activities or relationships with others, and states that she uses it as a coping mechanism/help her sleep.  She does not take trazodone the same night that she is drinking.  She does not drink and drive.  I explained to patient the risks of alcohol use including liver cirrhosis.  Patient expresses understanding and does

## 2024-11-06 ENCOUNTER — OFFICE VISIT (OUTPATIENT)
Dept: FAMILY MEDICINE CLINIC | Age: 44
End: 2024-11-06

## 2024-11-06 VITALS
OXYGEN SATURATION: 97 % | DIASTOLIC BLOOD PRESSURE: 77 MMHG | SYSTOLIC BLOOD PRESSURE: 112 MMHG | HEART RATE: 66 BPM | BODY MASS INDEX: 28.87 KG/M2 | WEIGHT: 163 LBS | RESPIRATION RATE: 16 BRPM

## 2024-11-06 DIAGNOSIS — F32.0 CURRENT MILD EPISODE OF MAJOR DEPRESSIVE DISORDER, UNSPECIFIED WHETHER RECURRENT (HCC): ICD-10-CM

## 2024-11-06 DIAGNOSIS — Z78.9 ALCOHOL USE: ICD-10-CM

## 2024-11-06 DIAGNOSIS — G47.33 OBSTRUCTIVE SLEEP APNEA: ICD-10-CM

## 2024-11-06 DIAGNOSIS — K58.1 IRRITABLE BOWEL SYNDROME WITH CONSTIPATION: ICD-10-CM

## 2024-11-06 DIAGNOSIS — Z23 NEED FOR INFLUENZA VACCINATION: Primary | ICD-10-CM

## 2024-11-06 RX ORDER — PRAVASTATIN SODIUM 20 MG
20 TABLET ORAL NIGHTLY
COMMUNITY

## 2024-11-06 RX ORDER — HYDROXYZINE PAMOATE 100 MG
100 CAPSULE ORAL 2 TIMES DAILY PRN
COMMUNITY
Start: 2024-10-05

## 2024-11-06 RX ORDER — FLUTICASONE PROPIONATE 50 MCG
1 SPRAY, SUSPENSION (ML) NASAL DAILY PRN
COMMUNITY

## 2024-11-06 RX ORDER — COPPER 313.4 MG/1
1 INTRAUTERINE DEVICE INTRAUTERINE ONCE
COMMUNITY
End: 2024-11-06

## 2024-11-06 RX ORDER — TRAZODONE HYDROCHLORIDE 100 MG/1
100 TABLET ORAL NIGHTLY
COMMUNITY
Start: 2024-10-05

## 2024-11-06 RX ORDER — ACETAMINOPHEN 160 MG
2000 TABLET,DISINTEGRATING ORAL DAILY
COMMUNITY

## 2024-11-06 RX ORDER — LUBIPROSTONE 24 UG/1
24 CAPSULE ORAL 2 TIMES DAILY WITH MEALS
COMMUNITY
Start: 2024-09-05

## 2024-11-06 RX ORDER — PLECANATIDE 3 MG/1
1 TABLET ORAL DAILY
COMMUNITY
Start: 2024-06-21

## 2024-11-06 SDOH — ECONOMIC STABILITY: FOOD INSECURITY: WITHIN THE PAST 12 MONTHS, THE FOOD YOU BOUGHT JUST DIDN'T LAST AND YOU DIDN'T HAVE MONEY TO GET MORE.: NEVER TRUE

## 2024-11-06 SDOH — ECONOMIC STABILITY: FOOD INSECURITY: WITHIN THE PAST 12 MONTHS, YOU WORRIED THAT YOUR FOOD WOULD RUN OUT BEFORE YOU GOT MONEY TO BUY MORE.: NEVER TRUE

## 2024-11-06 SDOH — ECONOMIC STABILITY: INCOME INSECURITY: HOW HARD IS IT FOR YOU TO PAY FOR THE VERY BASICS LIKE FOOD, HOUSING, MEDICAL CARE, AND HEATING?: NOT HARD AT ALL

## 2024-11-06 ASSESSMENT — ANXIETY QUESTIONNAIRES
4. TROUBLE RELAXING: SEVERAL DAYS
7. FEELING AFRAID AS IF SOMETHING AWFUL MIGHT HAPPEN: NOT AT ALL
5. BEING SO RESTLESS THAT IT IS HARD TO SIT STILL: NOT AT ALL
IF YOU CHECKED OFF ANY PROBLEMS ON THIS QUESTIONNAIRE, HOW DIFFICULT HAVE THESE PROBLEMS MADE IT FOR YOU TO DO YOUR WORK, TAKE CARE OF THINGS AT HOME, OR GET ALONG WITH OTHER PEOPLE: NOT DIFFICULT AT ALL
GAD7 TOTAL SCORE: 7
1. FEELING NERVOUS, ANXIOUS, OR ON EDGE: SEVERAL DAYS
3. WORRYING TOO MUCH ABOUT DIFFERENT THINGS: SEVERAL DAYS
2. NOT BEING ABLE TO STOP OR CONTROL WORRYING: SEVERAL DAYS
6. BECOMING EASILY ANNOYED OR IRRITABLE: NEARLY EVERY DAY

## 2024-11-06 ASSESSMENT — PATIENT HEALTH QUESTIONNAIRE - PHQ9
6. FEELING BAD ABOUT YOURSELF - OR THAT YOU ARE A FAILURE OR HAVE LET YOURSELF OR YOUR FAMILY DOWN: SEVERAL DAYS
8. MOVING OR SPEAKING SO SLOWLY THAT OTHER PEOPLE COULD HAVE NOTICED. OR THE OPPOSITE, BEING SO FIGETY OR RESTLESS THAT YOU HAVE BEEN MOVING AROUND A LOT MORE THAN USUAL: NOT AT ALL
5. POOR APPETITE OR OVEREATING: SEVERAL DAYS
SUM OF ALL RESPONSES TO PHQ QUESTIONS 1-9: 9
1. LITTLE INTEREST OR PLEASURE IN DOING THINGS: SEVERAL DAYS
9. THOUGHTS THAT YOU WOULD BE BETTER OFF DEAD, OR OF HURTING YOURSELF: NOT AT ALL
SUM OF ALL RESPONSES TO PHQ QUESTIONS 1-9: 9
7. TROUBLE CONCENTRATING ON THINGS, SUCH AS READING THE NEWSPAPER OR WATCHING TELEVISION: SEVERAL DAYS
4. FEELING TIRED OR HAVING LITTLE ENERGY: NEARLY EVERY DAY
SUM OF ALL RESPONSES TO PHQ QUESTIONS 1-9: 9
3. TROUBLE FALLING OR STAYING ASLEEP: SEVERAL DAYS
SUM OF ALL RESPONSES TO PHQ9 QUESTIONS 1 & 2: 2
10. IF YOU CHECKED OFF ANY PROBLEMS, HOW DIFFICULT HAVE THESE PROBLEMS MADE IT FOR YOU TO DO YOUR WORK, TAKE CARE OF THINGS AT HOME, OR GET ALONG WITH OTHER PEOPLE: NOT DIFFICULT AT ALL
2. FEELING DOWN, DEPRESSED OR HOPELESS: SEVERAL DAYS
SUM OF ALL RESPONSES TO PHQ QUESTIONS 1-9: 9

## 2024-11-06 ASSESSMENT — ENCOUNTER SYMPTOMS
ABDOMINAL PAIN: 1
SHORTNESS OF BREATH: 0
CONSTIPATION: 1
TROUBLE SWALLOWING: 0
VOICE CHANGE: 0

## 2024-11-06 NOTE — ASSESSMENT & PLAN NOTE
Monitored by specialist- no acute findings meriting change in the plan. She continues with pelvic floor therapy with improvement in constipation, now having bowel movements weekly as opposed to monthly. She has taken herself off of Trulance (plecanatine) but continues Amitiza (lubiprostone). She plans to follow up with GI after pelvic floor therapy.

## 2024-11-06 NOTE — ASSESSMENT & PLAN NOTE
Monitored by specialist- no acute findings meriting change in the plan.  She would benefit from counseling or behavioral therapy. No suicidal or homicidal ideation.  PHQ-9 Total Score: 9 (11/6/2024  7:46 AM)  Thoughts that you would be better off dead, or of hurting yourself in some way: 0 (11/6/2024  7:46 AM)

## 2025-06-19 ENCOUNTER — TELEPHONE (OUTPATIENT)
Dept: FAMILY MEDICINE CLINIC | Age: 45
End: 2025-06-19

## 2025-07-01 ENCOUNTER — OFFICE VISIT (OUTPATIENT)
Dept: FAMILY MEDICINE CLINIC | Age: 45
End: 2025-07-01
Payer: COMMERCIAL

## 2025-07-01 VITALS
BODY MASS INDEX: 26.39 KG/M2 | SYSTOLIC BLOOD PRESSURE: 109 MMHG | RESPIRATION RATE: 18 BRPM | HEART RATE: 82 BPM | DIASTOLIC BLOOD PRESSURE: 71 MMHG | WEIGHT: 149 LBS | OXYGEN SATURATION: 98 %

## 2025-07-01 DIAGNOSIS — F32.A ANXIETY AND DEPRESSION: ICD-10-CM

## 2025-07-01 DIAGNOSIS — E78.1 HYPERTRIGLYCERIDEMIA: ICD-10-CM

## 2025-07-01 DIAGNOSIS — F41.9 ANXIETY AND DEPRESSION: ICD-10-CM

## 2025-07-01 DIAGNOSIS — K58.1 IRRITABLE BOWEL SYNDROME WITH CONSTIPATION: Primary | ICD-10-CM

## 2025-07-01 PROCEDURE — 99213 OFFICE O/P EST LOW 20 MIN: CPT

## 2025-07-01 RX ORDER — PRAVASTATIN SODIUM 20 MG
20 TABLET ORAL EVERY EVENING
Qty: 90 TABLET | Refills: 3 | Status: SHIPPED | OUTPATIENT
Start: 2025-07-01 | End: 2025-09-29

## 2025-07-01 RX ORDER — TIRZEPATIDE 10 MG/.5ML
10 INJECTION, SOLUTION SUBCUTANEOUS WEEKLY
COMMUNITY

## 2025-07-01 SDOH — ECONOMIC STABILITY: FOOD INSECURITY: WITHIN THE PAST 12 MONTHS, THE FOOD YOU BOUGHT JUST DIDN'T LAST AND YOU DIDN'T HAVE MONEY TO GET MORE.: NEVER TRUE

## 2025-07-01 SDOH — ECONOMIC STABILITY: FOOD INSECURITY: WITHIN THE PAST 12 MONTHS, YOU WORRIED THAT YOUR FOOD WOULD RUN OUT BEFORE YOU GOT MONEY TO BUY MORE.: NEVER TRUE

## 2025-07-01 ASSESSMENT — PATIENT HEALTH QUESTIONNAIRE - PHQ9
SUM OF ALL RESPONSES TO PHQ QUESTIONS 1-9: 5
SUM OF ALL RESPONSES TO PHQ QUESTIONS 1-9: 5
3. TROUBLE FALLING OR STAYING ASLEEP: SEVERAL DAYS
4. FEELING TIRED OR HAVING LITTLE ENERGY: NEARLY EVERY DAY
5. POOR APPETITE OR OVEREATING: NOT AT ALL
7. TROUBLE CONCENTRATING ON THINGS, SUCH AS READING THE NEWSPAPER OR WATCHING TELEVISION: NOT AT ALL
2. FEELING DOWN, DEPRESSED OR HOPELESS: SEVERAL DAYS
8. MOVING OR SPEAKING SO SLOWLY THAT OTHER PEOPLE COULD HAVE NOTICED. OR THE OPPOSITE, BEING SO FIGETY OR RESTLESS THAT YOU HAVE BEEN MOVING AROUND A LOT MORE THAN USUAL: NOT AT ALL
10. IF YOU CHECKED OFF ANY PROBLEMS, HOW DIFFICULT HAVE THESE PROBLEMS MADE IT FOR YOU TO DO YOUR WORK, TAKE CARE OF THINGS AT HOME, OR GET ALONG WITH OTHER PEOPLE: NOT DIFFICULT AT ALL
6. FEELING BAD ABOUT YOURSELF - OR THAT YOU ARE A FAILURE OR HAVE LET YOURSELF OR YOUR FAMILY DOWN: NOT AT ALL
9. THOUGHTS THAT YOU WOULD BE BETTER OFF DEAD, OR OF HURTING YOURSELF: NOT AT ALL
SUM OF ALL RESPONSES TO PHQ QUESTIONS 1-9: 5
1. LITTLE INTEREST OR PLEASURE IN DOING THINGS: NOT AT ALL
SUM OF ALL RESPONSES TO PHQ QUESTIONS 1-9: 5

## 2025-07-01 NOTE — PROGRESS NOTES
Mel Valerio (:  1980) is a 44 y.o. female, here for evaluation of the following chief complaint(s):  Follow-up Chronic Condition         Assessment & Plan  1. IBS with Constipation  - Chronic, managed by gastroenterologist  - Much improved following pelvic floor therapy  - No longer requiring any of her constipation medications, she states this previously cost her $500 per month    2. Depression, anxiety  - Chronic, stable and managed by psychiatry  - PHQ-9 score of 5 today  - On trazodone, hydroxyzine, sertraline    3. Weight Loss Management  - On Zepbound, purchased online  - Discussed medication and side effects  - Weight now 149 lbs  - Monitoring weight and health    4.  Hyperlipidemia  - Chronic, stable  - Refill pravastatin 20 mg  - Repeat lipid panel fasting and at future office visit in 6 months      PHQ-9 Total Score: 5 (2025  8:15 AM)  Thoughts that you would be better off dead, or of hurting yourself in some way: 0 (2025  8:15 AM)      Results    1. Irritable bowel syndrome with constipation  2. Hypertriglyceridemia  3. Anxiety and depression    Return in about 6 months (around 2026) for Chronic conditions, fasting labs.       Subjective   History of Present Illness  44-year-old female presents for follow-up. PMH: IBS with constipation, allergic rhinitis, asthma, hypertriglyceridemia, insomnia, depression, anxiety.    Weight loss since last visit, now 149 lbs. Patient is doing well today.    Reports chronic IBS, under care of gastroenterologist and previously endocrinologist.  Patient obtain Zepbound for weight loss from [employer].  Her endocrinologist previously prescribed her statin and is requesting that this writer takes over management for her cholesterol medication.  She reports that her constipation has essentially resolved following pelvic floor therapy and she no longer requires any of her constipation medications that were being managed and prescribed by

## 2025-09-03 ENCOUNTER — TELEMEDICINE (OUTPATIENT)
Dept: PULMONOLOGY | Age: 45
End: 2025-09-03
Payer: COMMERCIAL

## 2025-09-03 DIAGNOSIS — J45.20 MILD INTERMITTENT ASTHMA WITHOUT COMPLICATION: ICD-10-CM

## 2025-09-03 DIAGNOSIS — G47.33 OSA (OBSTRUCTIVE SLEEP APNEA): Primary | ICD-10-CM

## 2025-09-03 PROCEDURE — 99447 NTRPROF PH1/NTRNET/EHR 11-20: CPT | Performed by: INTERNAL MEDICINE

## 2025-09-03 RX ORDER — ALBUTEROL SULFATE 90 UG/1
2 INHALANT RESPIRATORY (INHALATION) EVERY 4 HOURS PRN
COMMUNITY

## 2025-09-03 ASSESSMENT — SLEEP AND FATIGUE QUESTIONNAIRES
HOW LIKELY ARE YOU TO NOD OFF OR FALL ASLEEP WHILE SITTING AND TALKING TO SOMEONE: WOULD NEVER DOZE
HOW LIKELY ARE YOU TO NOD OFF OR FALL ASLEEP WHILE SITTING AND READING: MODERATE CHANCE OF DOZING
HOW LIKELY ARE YOU TO NOD OFF OR FALL ASLEEP WHEN YOU ARE A PASSENGER IN A CAR FOR AN HOUR WITHOUT A BREAK: MODERATE CHANCE OF DOZING
HOW LIKELY ARE YOU TO NOD OFF OR FALL ASLEEP WHILE SITTING INACTIVE IN A PUBLIC PLACE: WOULD NEVER DOZE
HOW LIKELY ARE YOU TO NOD OFF OR FALL ASLEEP IN A CAR, WHILE STOPPED FOR A FEW MINUTES IN TRAFFIC: WOULD NEVER DOZE
ESS TOTAL SCORE: 11
HOW LIKELY ARE YOU TO NOD OFF OR FALL ASLEEP WHILE WATCHING TV: HIGH CHANCE OF DOZING
HOW LIKELY ARE YOU TO NOD OFF OR FALL ASLEEP WHILE SITTING QUIETLY AFTER LUNCH WITHOUT ALCOHOL: SLIGHT CHANCE OF DOZING
HOW LIKELY ARE YOU TO NOD OFF OR FALL ASLEEP WHILE LYING DOWN TO REST IN THE AFTERNOON WHEN CIRCUMSTANCES PERMIT: HIGH CHANCE OF DOZING